# Patient Record
Sex: FEMALE | Race: WHITE | Employment: OTHER | URBAN - METROPOLITAN AREA
[De-identification: names, ages, dates, MRNs, and addresses within clinical notes are randomized per-mention and may not be internally consistent; named-entity substitution may affect disease eponyms.]

---

## 2022-07-21 ENCOUNTER — APPOINTMENT (OUTPATIENT)
Dept: CT IMAGING | Age: 67
DRG: 312 | End: 2022-07-21
Attending: EMERGENCY MEDICINE
Payer: MEDICARE

## 2022-07-21 ENCOUNTER — HOSPITAL ENCOUNTER (INPATIENT)
Age: 67
LOS: 2 days | Discharge: HOME | DRG: 312 | End: 2022-07-23
Attending: EMERGENCY MEDICINE | Admitting: FAMILY MEDICINE
Payer: MEDICARE

## 2022-07-21 ENCOUNTER — APPOINTMENT (OUTPATIENT)
Dept: GENERAL RADIOLOGY | Age: 67
DRG: 312 | End: 2022-07-21
Attending: EMERGENCY MEDICINE
Payer: MEDICARE

## 2022-07-21 ENCOUNTER — APPOINTMENT (OUTPATIENT)
Dept: NON INVASIVE DIAGNOSTICS | Age: 67
DRG: 312 | End: 2022-07-21
Attending: FAMILY MEDICINE
Payer: MEDICARE

## 2022-07-21 DIAGNOSIS — D72.819 LEUKOPENIA, UNSPECIFIED TYPE: ICD-10-CM

## 2022-07-21 DIAGNOSIS — R55 SYNCOPE AND COLLAPSE: Primary | ICD-10-CM

## 2022-07-21 PROBLEM — R50.9 FEVER: Status: ACTIVE | Noted: 2022-07-21

## 2022-07-21 PROBLEM — D61.818 PANCYTOPENIA (HCC): Status: ACTIVE | Noted: 2022-07-21

## 2022-07-21 LAB
ALBUMIN SERPL-MCNC: 3.6 G/DL (ref 3.4–5)
ALBUMIN/GLOB SERPL: 1 {RATIO} (ref 0.8–1.7)
ALP SERPL-CCNC: 93 U/L (ref 45–117)
ALT SERPL-CCNC: 102 U/L (ref 13–56)
ANION GAP SERPL CALC-SCNC: 8 MMOL/L (ref 3–18)
APPEARANCE UR: CLEAR
AST SERPL-CCNC: 66 U/L (ref 10–38)
BACTERIA URNS QL MICRO: ABNORMAL /HPF
BASOPHILS # BLD: 0 K/UL (ref 0–0.1)
BASOPHILS NFR BLD: 1 % (ref 0–2)
BILIRUB SERPL-MCNC: 1 MG/DL (ref 0.2–1)
BILIRUB UR QL: NEGATIVE
BUN SERPL-MCNC: 11 MG/DL (ref 7–18)
BUN/CREAT SERPL: 15 (ref 12–20)
CALCIUM SERPL-MCNC: 8.6 MG/DL (ref 8.5–10.1)
CHLORIDE SERPL-SCNC: 99 MMOL/L (ref 100–111)
CO2 SERPL-SCNC: 24 MMOL/L (ref 21–32)
COLOR UR: YELLOW
COVID-19 RAPID TEST, COVR: NOT DETECTED
CREAT SERPL-MCNC: 0.73 MG/DL (ref 0.6–1.3)
DIFFERENTIAL METHOD BLD: ABNORMAL
ECHO AO ASC DIAM: 2.6 CM
ECHO AO ASCENDING AORTA INDEX: 1.52 CM/M2
ECHO AO ROOT DIAM: 3 CM
ECHO AO ROOT INDEX: 1.75 CM/M2
ECHO AV AREA PEAK VELOCITY: 2.7 CM2
ECHO AV AREA VTI: 2.9 CM2
ECHO AV AREA/BSA PEAK VELOCITY: 1.6 CM2/M2
ECHO AV AREA/BSA VTI: 1.7 CM2/M2
ECHO AV MEAN GRADIENT: 4 MMHG
ECHO AV MEAN VELOCITY: 0.9 M/S
ECHO AV PEAK GRADIENT: 6 MMHG
ECHO AV PEAK VELOCITY: 1.2 M/S
ECHO AV VELOCITY RATIO: 0.92
ECHO AV VTI: 23.3 CM
ECHO EST RA PRESSURE: 3 MMHG
ECHO LA DIAMETER INDEX: 1.7 CM/M2
ECHO LA DIAMETER: 2.9 CM
ECHO LA TO AORTIC ROOT RATIO: 0.97
ECHO LA VOL 2C: 35 ML (ref 22–52)
ECHO LA VOL 4C: 23 ML (ref 22–52)
ECHO LA VOL BP: 30 ML (ref 22–52)
ECHO LA VOL/BSA BIPLANE: 18 ML/M2 (ref 16–34)
ECHO LA VOLUME AREA LENGTH: 32 ML
ECHO LA VOLUME INDEX A2C: 20 ML/M2 (ref 16–34)
ECHO LA VOLUME INDEX A4C: 13 ML/M2 (ref 16–34)
ECHO LA VOLUME INDEX AREA LENGTH: 19 ML/M2 (ref 16–34)
ECHO LV E' LATERAL VELOCITY: 13 CM/S
ECHO LV E' SEPTAL VELOCITY: 11 CM/S
ECHO LV EDV A2C: 42 ML
ECHO LV EDV A4C: 54 ML
ECHO LV EDV BP: 48 ML (ref 56–104)
ECHO LV EDV INDEX A4C: 32 ML/M2
ECHO LV EDV INDEX BP: 28 ML/M2
ECHO LV EDV NDEX A2C: 25 ML/M2
ECHO LV EJECTION FRACTION A2C: 65 %
ECHO LV EJECTION FRACTION A4C: 71 %
ECHO LV EJECTION FRACTION BIPLANE: 68 % (ref 55–100)
ECHO LV ESV A2C: 14 ML
ECHO LV ESV A4C: 16 ML
ECHO LV ESV BP: 15 ML (ref 19–49)
ECHO LV ESV INDEX A2C: 8 ML/M2
ECHO LV ESV INDEX A4C: 9 ML/M2
ECHO LV ESV INDEX BP: 9 ML/M2
ECHO LV FRACTIONAL SHORTENING: 49 % (ref 28–44)
ECHO LV INTERNAL DIMENSION DIASTOLE INDEX: 2.51 CM/M2
ECHO LV INTERNAL DIMENSION DIASTOLIC: 4.3 CM (ref 3.9–5.3)
ECHO LV INTERNAL DIMENSION SYSTOLIC INDEX: 1.29 CM/M2
ECHO LV INTERNAL DIMENSION SYSTOLIC: 2.2 CM
ECHO LV IVSD: 0.7 CM (ref 0.6–0.9)
ECHO LV MASS 2D: 96.8 G (ref 67–162)
ECHO LV MASS INDEX 2D: 56.6 G/M2 (ref 43–95)
ECHO LV POSTERIOR WALL DIASTOLIC: 0.8 CM (ref 0.6–0.9)
ECHO LV RELATIVE WALL THICKNESS RATIO: 0.37
ECHO LVOT AREA: 3.1 CM2
ECHO LVOT AV VTI INDEX: 0.94
ECHO LVOT DIAM: 2 CM
ECHO LVOT MEAN GRADIENT: 3 MMHG
ECHO LVOT PEAK GRADIENT: 4 MMHG
ECHO LVOT PEAK VELOCITY: 1.1 M/S
ECHO LVOT STROKE VOLUME INDEX: 40.2 ML/M2
ECHO LVOT SV: 68.8 ML
ECHO LVOT VTI: 21.9 CM
ECHO MV A VELOCITY: 0.75 M/S
ECHO MV E DECELERATION TIME (DT): 201.9 MS
ECHO MV E VELOCITY: 0.72 M/S
ECHO MV E/A RATIO: 0.96
ECHO MV E/E' LATERAL: 5.54
ECHO MV E/E' RATIO (AVERAGED): 6.04
ECHO MV E/E' SEPTAL: 6.55
ECHO RIGHT VENTRICULAR SYSTOLIC PRESSURE (RVSP): 30 MMHG
ECHO RV INTERNAL DIMENSION: 3.1 CM
ECHO RV TAPSE: 1.5 CM (ref 1.7–?)
ECHO RVOT MEAN GRADIENT: 2 MMHG
ECHO RVOT PEAK GRADIENT: 4 MMHG
ECHO RVOT PEAK VELOCITY: 0.9 M/S
ECHO RVOT VTI: 19.5 CM
ECHO TV REGURGITANT MAX VELOCITY: 2.61 M/S
ECHO TV REGURGITANT PEAK GRADIENT: 27 MMHG
EOSINOPHIL # BLD: 0 K/UL (ref 0–0.4)
EOSINOPHIL NFR BLD: 0 % (ref 0–5)
EPITH CASTS URNS QL MICRO: ABNORMAL /LPF (ref 0–5)
ERYTHROCYTE [DISTWIDTH] IN BLOOD BY AUTOMATED COUNT: 16.6 % (ref 11.6–14.5)
FLUAV AG NPH QL IA: NEGATIVE
FLUBV AG NOSE QL IA: NEGATIVE
GLOBULIN SER CALC-MCNC: 3.5 G/DL (ref 2–4)
GLUCOSE SERPL-MCNC: 171 MG/DL (ref 74–99)
GLUCOSE UR STRIP.AUTO-MCNC: 100 MG/DL
HCT VFR BLD AUTO: 28.4 % (ref 35–45)
HGB BLD-MCNC: 9.7 G/DL (ref 12–16)
HGB UR QL STRIP: ABNORMAL
IMM GRANULOCYTES # BLD AUTO: 0 K/UL (ref 0–0.04)
IMM GRANULOCYTES NFR BLD AUTO: 1 % (ref 0–0.5)
KETONES UR QL STRIP.AUTO: ABNORMAL MG/DL
LEUKOCYTE ESTERASE UR QL STRIP.AUTO: NEGATIVE
LYMPHOCYTES # BLD: 0.4 K/UL (ref 0.9–3.6)
LYMPHOCYTES NFR BLD: 38 % (ref 21–52)
MAGNESIUM SERPL-MCNC: 2.3 MG/DL (ref 1.6–2.6)
MCH RBC QN AUTO: 31.2 PG (ref 24–34)
MCHC RBC AUTO-ENTMCNC: 34.2 G/DL (ref 31–37)
MCV RBC AUTO: 91.3 FL (ref 78–100)
MONOCYTES # BLD: 0.1 K/UL (ref 0.05–1.2)
MONOCYTES NFR BLD: 10 % (ref 3–10)
NEUTS SEG # BLD: 0.6 K/UL (ref 1.8–8)
NEUTS SEG NFR BLD: 51 % (ref 40–73)
NITRITE UR QL STRIP.AUTO: NEGATIVE
NRBC # BLD: 0 K/UL (ref 0–0.01)
NRBC BLD-RTO: 0 PER 100 WBC
PH UR STRIP: 6.5 [PH] (ref 5–8)
PLATELET # BLD AUTO: 63 K/UL (ref 135–420)
PMV BLD AUTO: 10.5 FL (ref 9.2–11.8)
POTASSIUM SERPL-SCNC: 4.1 MMOL/L (ref 3.5–5.5)
PROT SERPL-MCNC: 7.1 G/DL (ref 6.4–8.2)
PROT UR STRIP-MCNC: ABNORMAL MG/DL
RBC # BLD AUTO: 3.11 M/UL (ref 4.2–5.3)
RBC #/AREA URNS HPF: ABNORMAL /HPF (ref 0–5)
SODIUM SERPL-SCNC: 131 MMOL/L (ref 136–145)
SOURCE, COVRS: NORMAL
SP GR UR REFRACTOMETRY: 1.01 (ref 1–1.03)
TROPONIN-HIGH SENSITIVITY: <3 NG/L (ref 0–54)
TROPONIN-HIGH SENSITIVITY: <3 NG/L (ref 0–54)
TSH SERPL DL<=0.05 MIU/L-ACNC: 1.31 UIU/ML (ref 0.36–3.74)
UROBILINOGEN UR QL STRIP.AUTO: 1 EU/DL (ref 0.2–1)
WBC # BLD AUTO: 1.1 K/UL (ref 4.6–13.2)
WBC URNS QL MICRO: ABNORMAL /HPF (ref 0–5)

## 2022-07-21 PROCEDURE — 71045 X-RAY EXAM CHEST 1 VIEW: CPT

## 2022-07-21 PROCEDURE — 87804 INFLUENZA ASSAY W/OPTIC: CPT

## 2022-07-21 PROCEDURE — 93306 TTE W/DOPPLER COMPLETE: CPT

## 2022-07-21 PROCEDURE — 74011250636 HC RX REV CODE- 250/636: Performed by: EMERGENCY MEDICINE

## 2022-07-21 PROCEDURE — 96361 HYDRATE IV INFUSION ADD-ON: CPT

## 2022-07-21 PROCEDURE — 84484 ASSAY OF TROPONIN QUANT: CPT

## 2022-07-21 PROCEDURE — 84443 ASSAY THYROID STIM HORMONE: CPT

## 2022-07-21 PROCEDURE — 36415 COLL VENOUS BLD VENIPUNCTURE: CPT

## 2022-07-21 PROCEDURE — 96374 THER/PROPH/DIAG INJ IV PUSH: CPT

## 2022-07-21 PROCEDURE — 88184 FLOWCYTOMETRY/ TC 1 MARKER: CPT

## 2022-07-21 PROCEDURE — 65270000046 HC RM TELEMETRY

## 2022-07-21 PROCEDURE — 74011000250 HC RX REV CODE- 250: Performed by: FAMILY MEDICINE

## 2022-07-21 PROCEDURE — 70450 CT HEAD/BRAIN W/O DYE: CPT

## 2022-07-21 PROCEDURE — 93005 ELECTROCARDIOGRAM TRACING: CPT

## 2022-07-21 PROCEDURE — 94761 N-INVAS EAR/PLS OXIMETRY MLT: CPT

## 2022-07-21 PROCEDURE — 72110 X-RAY EXAM L-2 SPINE 4/>VWS: CPT

## 2022-07-21 PROCEDURE — 80053 COMPREHEN METABOLIC PANEL: CPT

## 2022-07-21 PROCEDURE — 74011250637 HC RX REV CODE- 250/637: Performed by: FAMILY MEDICINE

## 2022-07-21 PROCEDURE — 81001 URINALYSIS AUTO W/SCOPE: CPT

## 2022-07-21 PROCEDURE — 87635 SARS-COV-2 COVID-19 AMP PRB: CPT

## 2022-07-21 PROCEDURE — 88185 FLOWCYTOMETRY/TC ADD-ON: CPT

## 2022-07-21 PROCEDURE — 74011250637 HC RX REV CODE- 250/637: Performed by: INTERNAL MEDICINE

## 2022-07-21 PROCEDURE — 85025 COMPLETE CBC W/AUTO DIFF WBC: CPT

## 2022-07-21 PROCEDURE — 96375 TX/PRO/DX INJ NEW DRUG ADDON: CPT

## 2022-07-21 PROCEDURE — 99285 EMERGENCY DEPT VISIT HI MDM: CPT

## 2022-07-21 PROCEDURE — 83735 ASSAY OF MAGNESIUM: CPT

## 2022-07-21 RX ORDER — SODIUM CHLORIDE 0.9 % (FLUSH) 0.9 %
5-40 SYRINGE (ML) INJECTION EVERY 8 HOURS
Status: DISCONTINUED | OUTPATIENT
Start: 2022-07-21 | End: 2022-07-23 | Stop reason: HOSPADM

## 2022-07-21 RX ORDER — SIMVASTATIN 5 MG/1
5 TABLET, FILM COATED ORAL
COMMUNITY

## 2022-07-21 RX ORDER — ONDANSETRON 2 MG/ML
4 INJECTION INTRAMUSCULAR; INTRAVENOUS ONCE
Status: COMPLETED | OUTPATIENT
Start: 2022-07-21 | End: 2022-07-21

## 2022-07-21 RX ORDER — GUAIFENESIN 600 MG/1
600 TABLET, EXTENDED RELEASE ORAL EVERY 12 HOURS
Status: DISCONTINUED | OUTPATIENT
Start: 2022-07-21 | End: 2022-07-22

## 2022-07-21 RX ORDER — OXYCODONE HYDROCHLORIDE 5 MG/1
5 TABLET ORAL
Status: DISCONTINUED | OUTPATIENT
Start: 2022-07-21 | End: 2022-07-23 | Stop reason: HOSPADM

## 2022-07-21 RX ORDER — SODIUM CHLORIDE 0.9 % (FLUSH) 0.9 %
5-40 SYRINGE (ML) INJECTION AS NEEDED
Status: DISCONTINUED | OUTPATIENT
Start: 2022-07-21 | End: 2022-07-23 | Stop reason: HOSPADM

## 2022-07-21 RX ORDER — TOLTERODINE 4 MG/1
4 CAPSULE, EXTENDED RELEASE ORAL DAILY
COMMUNITY

## 2022-07-21 RX ORDER — NALOXONE HYDROCHLORIDE 0.4 MG/ML
0.4 INJECTION, SOLUTION INTRAMUSCULAR; INTRAVENOUS; SUBCUTANEOUS AS NEEDED
Status: DISCONTINUED | OUTPATIENT
Start: 2022-07-21 | End: 2022-07-23 | Stop reason: HOSPADM

## 2022-07-21 RX ORDER — LIDOCAINE 4 G/100G
2 PATCH TOPICAL EVERY 24 HOURS
Status: DISCONTINUED | OUTPATIENT
Start: 2022-07-21 | End: 2022-07-23 | Stop reason: HOSPADM

## 2022-07-21 RX ORDER — MORPHINE SULFATE 2 MG/ML
2 INJECTION, SOLUTION INTRAMUSCULAR; INTRAVENOUS ONCE
Status: COMPLETED | OUTPATIENT
Start: 2022-07-21 | End: 2022-07-21

## 2022-07-21 RX ORDER — CALCIUM CARBONATE/VITAMIN D3 600 MG-125
600 TABLET ORAL 2 TIMES DAILY
COMMUNITY
End: 2022-07-23

## 2022-07-21 RX ORDER — DOCUSATE SODIUM 100 MG/1
100 CAPSULE, LIQUID FILLED ORAL 2 TIMES DAILY
Status: DISCONTINUED | OUTPATIENT
Start: 2022-07-21 | End: 2022-07-23 | Stop reason: HOSPADM

## 2022-07-21 RX ORDER — BISMUTH SUBSALICYLATE 262 MG
1 TABLET,CHEWABLE ORAL DAILY
COMMUNITY

## 2022-07-21 RX ORDER — ACETAMINOPHEN 325 MG/1
650 TABLET ORAL
Status: DISCONTINUED | OUTPATIENT
Start: 2022-07-21 | End: 2022-07-23 | Stop reason: HOSPADM

## 2022-07-21 RX ADMIN — ONDANSETRON 4 MG: 2 INJECTION INTRAMUSCULAR; INTRAVENOUS at 07:06

## 2022-07-21 RX ADMIN — DOCUSATE SODIUM 100 MG: 100 CAPSULE ORAL at 20:24

## 2022-07-21 RX ADMIN — SODIUM CHLORIDE 1000 ML: 900 INJECTION, SOLUTION INTRAVENOUS at 03:01

## 2022-07-21 RX ADMIN — SODIUM CHLORIDE, PRESERVATIVE FREE 10 ML: 5 INJECTION INTRAVENOUS at 22:00

## 2022-07-21 RX ADMIN — DOCUSATE SODIUM 100 MG: 100 CAPSULE ORAL at 10:42

## 2022-07-21 RX ADMIN — SODIUM CHLORIDE, PRESERVATIVE FREE 10 ML: 5 INJECTION INTRAVENOUS at 18:50

## 2022-07-21 RX ADMIN — ACETAMINOPHEN 650 MG: 325 TABLET ORAL at 12:17

## 2022-07-21 RX ADMIN — MORPHINE SULFATE 2 MG: 2 INJECTION, SOLUTION INTRAMUSCULAR; INTRAVENOUS at 03:01

## 2022-07-21 RX ADMIN — OXYCODONE 5 MG: 5 TABLET ORAL at 18:49

## 2022-07-21 RX ADMIN — GUAIFENESIN 600 MG: 600 TABLET, EXTENDED RELEASE ORAL at 20:24

## 2022-07-21 NOTE — ED NOTES
Pt ambulated to the restroom. Pt states that back pain is 10/10. Pt reports feeling dizzy and nauseous when getting up from the toilet back to the stretcher.

## 2022-07-21 NOTE — ED NOTES
Pt states that when she attempted to go to the bathroom \"something popped in her lower right side of her back causing her to fall and black out. \" The second fall happened when she attempted to go to the bathroom again and she hit her head but does not remember. Pt states the pain is more in her back then head.

## 2022-07-21 NOTE — PROGRESS NOTES
Problem: Pressure Injury - Risk of  Goal: *Prevention of pressure injury  Description: Document Chad Scale and appropriate interventions in the flowsheet. Outcome: Progressing Towards Goal  Note: Pressure Injury Interventions: Activity Interventions: PT/OT evaluation    Mobility Interventions: PT/OT evaluation, HOB 30 degrees or less, Float heels    Nutrition Interventions: Offer support with meals,snacks and hydration                     Problem: Patient Education: Go to Patient Education Activity  Goal: Patient/Family Education  Outcome: Progressing Towards Goal     Problem: Falls - Risk of  Goal: *Absence of Falls  Description: Document Tamar Fall Risk and appropriate interventions in the flowsheet.   Outcome: Progressing Towards Goal  Note: Fall Risk Interventions:  Mobility Interventions: Patient to call before getting OOB         Medication Interventions: Patient to call before getting OOB, Teach patient to arise slowly    Elimination Interventions: Call light in reach, Toilet paper/wipes in reach    History of Falls Interventions: Room close to nurse's station         Problem: Patient Education: Go to Patient Education Activity  Goal: Patient/Family Education  Outcome: Progressing Towards Goal

## 2022-07-21 NOTE — ED NOTES
Pt states that she does not have to use the restroom right now, when asked if she could provide an urine specimen.

## 2022-07-21 NOTE — H&P
History & Physical    Patient: Fernando Rowley MRN: [id number]  CSN: [id number]    YOB: 1955  Age: 75 y.o. Sex: female      DOA: 7/21/2022  Primary Care Provider:  Ana Lux MD      Assessment/Plan   39-year-old female essentially healthy except for history of hyperlipidemia and overactive bladder. Admitted for syncope, pancytopenia and fever. Pancytopenia and fever -  After having viral symptoms  Outpatient COVID-19 test negative, COVID-19 rapid test in the emergency room also negative, rapid flu antigen a and B- negative  White blood count 1.1, hemoglobin 9.7, hematocrit 28.4 and platelets 63  Consulted hematology oncology, spoke with Dr. Trent Mendez  Ordered flow cytometry per Dr. Caron Figueroa recommendation  Antipyretics  X-ray unremarkable    Syncope-  Echocardiogram: Normal left ventricular systolic function with a visually estimated EF of 55 - 60%  Monitor on telemetry     DVT and GI prophylaxis     Patient Active Problem List   Diagnosis Code    Leukopenia D72.819    Syncope R55    Fever R50.9       Estimated length of stay : 3-4 days     CC: Syncope       HPI:     Fernando Rowley is a 75 y.o. female essentially healthy except for history of hyperlipidemia and overactive bladder, takes simvastatin, multivitamin, calcium supplement and a medication for her bladder. Told that she has a history of anemia but is currently not taking any medications. She has no idea what her baseline hemoglobin is. Presents to the emergency room with a history of 6-7 days of fever. Yesterday she saw her PCP, Dr. Zabrina Henriquez male who she stated thought that she probably had a viral infection; obtain a rapid COVID test which was negative. Her symptoms began with fever and cold symptoms including cough, headache, loss of appetite, runny nose and congestion.   She came to the emergency room today because she had 2 syncopal episodes yesterday when she felt in her back and the second 1 she fell and hit her head. She states that she does not remember anything she was normal 1 minute and woke up on the floor. Additional history she states that she follows Dr. Santino Blanco for hematuria but the last time he checked her urine was clear. Additional history she denies any history of bleeding gums when she brushes her teeth, she denies vaginal bleeding or blood in her stools or dark stools or tarry stools. SHe is vaccinated for COVID-19 and booster and her flu is up-to-date    No past medical history on file. No past surgical history on file. No family history on file. Social History     Socioeconomic History    Marital status:        Prior to Admission medications    Medication Sig Start Date End Date Taking? Authorizing Provider   calcium-cholecalciferol, d3, (CALCIUM 600 + D) 600-125 mg-unit tab Take 600 Tablets by mouth two (2) times a day. Yes Provider, Historical   multivitamin (ONE A DAY) tablet Take 1 Tablet by mouth in the morning. Yes Provider, Historical   simvastatin (ZOCOR) 5 mg tablet Take 5 mg by mouth nightly. Indications: excessive fat in the blood   Yes Provider, Historical   tolterodine ER (DETROL LA) 4 mg ER capsule Take 4 mg by mouth in the morning. Indications: urinary urgency, or the sudden urge to urinate   Yes Provider, Historical       No Known Allergies    Review of Systems  Gen: No fever, chills, malaise, weight loss/gain. Heent: No headache, rhinorrhea, epistaxis, ear pain, hearing loss, sinus pain, neck pain/stiffness, sore throat. Heart: No chest pain, palpitations, HOPKINS, pnd, or orthopnea. Resp: No cough, hemoptysis, wheezing and shortness of breath. GI: No nausea, vomiting, diarrhea, constipation, melena or hematochezia. : No urinary obstruction, dysuria or hematuria. Derm: No rash, new skin lesion or pruritis. Musc/skeletal: no bone or joint complains. Vasc: No edema, cyanosis or claudication.    Endo: No heat/cold intolerance, no polyuria,polydipsia or polyphagia. Neuro: No unilateral weakness, numbness, tingling. No seizures. Heme: No easy bruising or bleeding. Physical Exam:     Physical Exam:  Visit Vitals  /77   Pulse 82   Temp 98.9 °F (37.2 °C)   Resp 18   Ht 5' 7\" (1.702 m)   Wt 61.2 kg (135 lb)   SpO2 98%   BMI 21.14 kg/m²      O2 Device: None (Room air)    Temp (24hrs), Av.5 °F (37.5 °C), Min:98.9 °F (37.2 °C), Max:100.1 °F (37.8 °C)    No intake/output data recorded. No intake/output data recorded. General:  Awake, cooperative, no distress. Head:  Normocephalic, without obvious abnormality, atraumatic. Eyes:  Conjunctivae/corneas clear, sclera anicteric, PERRL, EOMs intact. Nose: Nares normal. No drainage or sinus tenderness. Throat: Lips, mucosa, and tongue normal.    Neck: Supple, symmetrical, trachea midline, no adenopathy. Lungs:   Clear to auscultation bilaterally. Heart:  Regular rate and rhythm, S1, S2 normal, no murmur, click, rub or gallop. Abdomen: Soft, non-tender. Bowel sounds normal. No masses,  No organomegaly. Extremities: Extremities normal, atraumatic, no cyanosis or edema. Capillary refill normal.   Pulses: 2+ and symmetric all extremities. Skin: Skin color pink, turgor normal. No rashes or lesions   Neurologic: CNII-XII intact. No focal motor or sensory deficit. Labs Reviewed:    Lab results reviewed. For significant abnormal values and values requiring intervention, see assessment and plan.   Recent Results (from the past 24 hour(s))   EKG, 12 LEAD, INITIAL    Collection Time: 22  2:59 AM   Result Value Ref Range    Ventricular Rate 82 BPM    Atrial Rate 82 BPM    P-R Interval 134 ms    QRS Duration 80 ms    Q-T Interval 354 ms    QTC Calculation (Bezet) 413 ms    Calculated P Axis 76 degrees    Calculated R Axis 73 degrees    Calculated T Axis 16 degrees    Diagnosis       Normal sinus rhythm  T wave abnormality, consider anterior ischemia  Abnormal ECG  No previous ECGs available     CBC WITH AUTOMATED DIFF    Collection Time: 07/21/22  3:09 AM   Result Value Ref Range    WBC 1.1 (L) 4.6 - 13.2 K/uL    RBC 3.11 (L) 4.20 - 5.30 M/uL    HGB 9.7 (L) 12.0 - 16.0 g/dL    HCT 28.4 (L) 35.0 - 45.0 %    MCV 91.3 78.0 - 100.0 FL    MCH 31.2 24.0 - 34.0 PG    MCHC 34.2 31.0 - 37.0 g/dL    RDW 16.6 (H) 11.6 - 14.5 %    PLATELET 63 (L) 975 - 420 K/uL    MPV 10.5 9.2 - 11.8 FL    NRBC 0.0 0  WBC    ABSOLUTE NRBC 0.00 0.00 - 0.01 K/uL    NEUTROPHILS 51 40 - 73 %    LYMPHOCYTES 38 21 - 52 %    MONOCYTES 10 3 - 10 %    EOSINOPHILS 0 0 - 5 %    BASOPHILS 1 0 - 2 %    IMMATURE GRANULOCYTES 1 (H) 0.0 - 0.5 %    ABS. NEUTROPHILS 0.6 (L) 1.8 - 8.0 K/UL    ABS. LYMPHOCYTES 0.4 (L) 0.9 - 3.6 K/UL    ABS. MONOCYTES 0.1 0.05 - 1.2 K/UL    ABS. EOSINOPHILS 0.0 0.0 - 0.4 K/UL    ABS. BASOPHILS 0.0 0.0 - 0.1 K/UL    ABS. IMM. GRANS. 0.0 0.00 - 0.04 K/UL    DF AUTOMATED     METABOLIC PANEL, COMPREHENSIVE    Collection Time: 07/21/22  3:09 AM   Result Value Ref Range    Sodium 131 (L) 136 - 145 mmol/L    Potassium 4.1 3.5 - 5.5 mmol/L    Chloride 99 (L) 100 - 111 mmol/L    CO2 24 21 - 32 mmol/L    Anion gap 8 3.0 - 18 mmol/L    Glucose 171 (H) 74 - 99 mg/dL    BUN 11 7.0 - 18 MG/DL    Creatinine 0.73 0.6 - 1.3 MG/DL    BUN/Creatinine ratio 15 12 - 20      GFR est AA >60 >60 ml/min/1.73m2    GFR est non-AA >60 >60 ml/min/1.73m2    Calcium 8.6 8.5 - 10.1 MG/DL    Bilirubin, total 1.0 0.2 - 1.0 MG/DL    ALT (SGPT) 102 (H) 13 - 56 U/L    AST (SGOT) 66 (H) 10 - 38 U/L    Alk.  phosphatase 93 45 - 117 U/L    Protein, total 7.1 6.4 - 8.2 g/dL    Albumin 3.6 3.4 - 5.0 g/dL    Globulin 3.5 2.0 - 4.0 g/dL    A-G Ratio 1.0 0.8 - 1.7     MAGNESIUM    Collection Time: 07/21/22  3:09 AM   Result Value Ref Range    Magnesium 2.3 1.6 - 2.6 mg/dL   TROPONIN-HIGH SENSITIVITY    Collection Time: 07/21/22  3:09 AM   Result Value Ref Range    Troponin-High Sensitivity <3 0 - 54 ng/L   TSH 3RD GENERATION    Collection Time: 07/21/22  3:09 AM   Result Value Ref Range    TSH 1.31 0.36 - 3.74 uIU/mL   COVID-19 RAPID TEST    Collection Time: 07/21/22  3:15 AM   Result Value Ref Range    Specimen source Nasopharyngeal      COVID-19 rapid test Not detected NOTD     INFLUENZA A & B AG (RAPID TEST)    Collection Time: 07/21/22  3:16 AM   Result Value Ref Range    Influenza A Antigen Negative NEG      Influenza B Antigen Negative NEG     URINALYSIS W/ RFLX MICROSCOPIC    Collection Time: 07/21/22  6:36 AM   Result Value Ref Range    Color YELLOW      Appearance CLEAR      Specific gravity 1.014 1.005 - 1.030      pH (UA) 6.5 5.0 - 8.0      Protein TRACE (A) NEG mg/dL    Glucose 100 (A) NEG mg/dL    Ketone TRACE (A) NEG mg/dL    Bilirubin Negative NEG      Blood TRACE (A) NEG      Urobilinogen 1.0 0.2 - 1.0 EU/dL    Nitrites Negative NEG      Leukocyte Esterase Negative NEG     URINE MICROSCOPIC ONLY    Collection Time: 07/21/22  6:36 AM   Result Value Ref Range    WBC 0 to 3 0 - 5 /hpf    RBC 0 to 3 0 - 5 /hpf    Epithelial cells FEW 0 - 5 /lpf    Bacteria 1+ (A) NEG /hpf   TROPONIN-HIGH SENSITIVITY    Collection Time: 07/21/22 10:30 AM   Result Value Ref Range    Troponin-High Sensitivity <3 0 - 54 ng/L   ECHO ADULT COMPLETE    Collection Time: 07/21/22 12:08 PM   Result Value Ref Range    Fractional Shortening 2D 49 28 - 44 %    LV ESV Index BP 9 mL/m2    LV EDV Index BP 28 mL/m2    LV ESV Index A4C 9 mL/m2    LV EDV Index A4C 32 mL/m2    LV ESV Index A2C 8 mL/m2    LV EDV Index A2C 25 mL/m2    LVIDd Index 2.51 cm/m2    LVIDs Index 1.29 cm/m2    LV RWT Ratio 0.37     LV Mass 2D 96.8 67 - 162 g    LV Mass 2D Index 56.6 43 - 95 g/m2    MV E/A 0.96     E/E' Ratio (Averaged) 6.04     E/E' Lateral 5.54     E/E' Septal 6.55     LA Volume Index BP 18 16 - 34 ml/m2    LA Volume Index A/L 19 16 - 34 mL/m2    LVOT Stroke Volume Index 40.2 mL/m2    LVOT Area 3.1 cm2    LA Volume Index 2C 20 16 - 34 mL/m2    LA Volume Index 4C 13 (A) 16 - 34 mL/m2    LA Size Index 1.70 cm/m2    LA/AO Root Ratio 0.97     Ao Root Index 1.75 cm/m2    Ascending Aorta Index 1.52 cm/m2    AV Velocity Ratio 0.92     LVOT:AV VTI Index 0.94     KODY/BSA VTI 1.7 cm2/m2    KODY/BSA Peak Velocity 1.6 cm2/m2    Est. RA Pressure 3 mmHg    RVSP 30 mmHg    IVSd 0.7 0.6 - 0.9 cm    LVIDd 4.3 3.9 - 5.3 cm    LVIDs 2.2 cm    LVOT Diameter 2.0 cm    LVPWd 0.8 0.6 - 0.9 cm    EF BP 68 55 - 100 %    LV Ejection Fraction A2C 65 %    LV Ejection Fraction A4C 71 %    LV EDV A2C 42 mL    LV EDV A4C 54 mL    LV EDV BP 48 (A) 56 - 104 mL    LV ESV A2C 14 mL    LV ESV A4C 16 mL    LV ESV BP 15 (A) 19 - 49 mL    LVOT Peak Gradient 4 mmHg    LVOT Mean Gradient 3 mmHg    LVOT SV 68.8 ml    LVOT Peak Velocity 1.1 m/s    LVOT VTI 21.9 cm    RVIDd 3.1 cm    RVOT Peak Gradient 4 mmHg    RVOT Mean Gradient 2 mmHg    RVOT Peak Velocity 0.9 m/s    RVOT VTI 19.5 cm    LA Diameter 2.9 cm    LA Volume A/L 32 mL    LA Volume 2C 35 22 - 52 mL    LA Volume 4C 23 22 - 52 mL    LA Volume BP 30 22 - 52 mL    AV Area by Peak Velocity 2.7 cm2    AV Area by VTI 2.9 cm2    AV Peak Gradient 6 mmHg    AV Mean Gradient 4 mmHg    AV Peak Velocity 1.2 m/s    AV Mean Velocity 0.9 m/s    AV VTI 23.3 cm    MV A Velocity 0.75 m/s    MV E Wave Deceleration Time 201.9 ms    MV E Velocity 0.72 m/s    LV E' Lateral Velocity 13 cm/s    LV E' Septal Velocity 11 cm/s    TAPSE 1.5 (A) 1.7 cm    TR Peak Gradient 27 mmHg    TR Max Velocity 2.61 m/s    Ascending Aorta 2.6 cm    Aortic Root 3.0 cm       Procedures/imaging: see electronic medical records for all procedures/Xrays and details which were not copied into this note but were reviewed prior to creation of Plan      CC: Lucio Callahan MD

## 2022-07-21 NOTE — ED PROVIDER NOTES
EMERGENCY DEPARTMENT HISTORY AND PHYSICAL EXAM    Date: 7/21/2022  Patient Name: Shelia Young    History of Presenting Illness     No chief complaint on file. History Provided By: Patient and Patient's      History Elma Ibrahim):   2:46 AM  Shelia Young is a 75 y.o. female with no contributory PMHX who presents to the emergency department (room 14) C/O syncopal fall onset this evening prior to arrival. Associated sxs include low back pain. Pt denies fever, chills, nausea, vomiting, or any other sxs or complaints. Per patient and , she has had upper respiratory symptoms, cough and myalgias since Monday (4 days ago). She saw Dr. Karen Castro yesterday. She had a negative COVID test at that time and was diagnosed with a viral illness/sinusitis and did not have any medications prescribed for her at that time. Patient had an incident tonight where she fell and hurt her lower lumbar back.  states that later she was getting into bed and fell, hitting her head on the nightstand. Patient denies any blood thinners. She does endorse loss of consciousness with both episodes. Chief Complaint: Syncopal fall  Onset: Tonight  Timing:  Acute  Context: Symptoms started spontaneously, symptoms have rapidly worsened since onset  Location: Generalized  Quality:  With LOC  Severity: Moderate  Modifying Factors: Nothing makes it better, or worse. Associated Symptoms: Head contusion and low back pain    PCP: Juaquin García MD     Past History         Past Medical History:  No past medical history on file. Past Surgical History:  No past surgical history on file. Family History:  No family history on file. Reviewed and non-contributory    Social History:       Medications: Allergies:  No Known Allergies    Review of Systems      Review of Systems   Constitutional:  Negative for chills and fever. HENT:  Negative for congestion, rhinorrhea and sore throat.     Eyes:  Negative for pain and visual disturbance. Respiratory:  Negative for cough, shortness of breath and wheezing. Cardiovascular:  Negative for chest pain and palpitations. Gastrointestinal:  Negative for abdominal pain, diarrhea and vomiting. Genitourinary:  Negative for dysuria, flank pain, frequency and urgency. Musculoskeletal:  Positive for back pain. Negative for arthralgias and myalgias. Skin:  Negative for rash and wound. Neurological:  Positive for syncope. Negative for speech difficulty, weakness, light-headedness and headaches. Psychiatric/Behavioral:  Negative for agitation and confusion. All other systems reviewed and are negative. Physical Exam     Vitals:    07/21/22 0710 07/21/22 0712 07/21/22 0714 07/21/22 0934   BP: (!) 123/56 (!) 117/54 (!) 90/35 (!) 137/59   Pulse: 75 72 70 81   Resp:    20   Temp:    99.9 °F (37.7 °C)   SpO2: 100% 100% 100% 99%   Weight:       Height:           Physical Exam  Vitals and nursing note reviewed. Constitutional:       General: She is not in acute distress. Appearance: Normal appearance. She is normal weight. She is not ill-appearing. HENT:      Head: Normocephalic and atraumatic. Nose: Nose normal. No rhinorrhea. Mouth/Throat:      Mouth: Mucous membranes are moist.      Pharynx: No oropharyngeal exudate or posterior oropharyngeal erythema. Eyes:      Extraocular Movements: Extraocular movements intact. Conjunctiva/sclera: Conjunctivae normal.      Pupils: Pupils are equal, round, and reactive to light. Cardiovascular:      Rate and Rhythm: Normal rate and regular rhythm. Heart sounds: No murmur heard. No friction rub. No gallop. Pulmonary:      Effort: Pulmonary effort is normal. No respiratory distress. Breath sounds: Normal breath sounds. No wheezing, rhonchi or rales. Abdominal:      General: Bowel sounds are normal.      Palpations: Abdomen is soft. Tenderness: There is no abdominal tenderness.  There is no guarding or rebound. Musculoskeletal:         General: No swelling or deformity. Normal range of motion. Cervical back: Normal range of motion and neck supple. No rigidity. Lumbar back: Tenderness present. No swelling, edema, deformity, signs of trauma, lacerations, spasms or bony tenderness. Normal range of motion. No scoliosis. Back:    Lymphadenopathy:      Cervical: No cervical adenopathy. Skin:     General: Skin is warm and dry. Findings: No rash. Neurological:      General: No focal deficit present. Mental Status: She is alert and oriented to person, place, and time. Cranial Nerves: Cranial nerves are intact. No cranial nerve deficit, dysarthria or facial asymmetry. Sensory: No sensory deficit. Motor: Motor function is intact. No weakness, tremor, atrophy, abnormal muscle tone, seizure activity or pronator drift. Coordination: Coordination is intact. Romberg sign negative.  Coordination normal. Finger-Nose-Finger Test normal. Rapid alternating movements normal.      Comments: No truncal ataxia, normal test of skew   Psychiatric:         Mood and Affect: Mood normal.         Behavior: Behavior normal.       Diagnostic Study Results     Labs -  Recent Results (from the past 12 hour(s))   EKG, 12 LEAD, INITIAL    Collection Time: 07/21/22  2:59 AM   Result Value Ref Range    Ventricular Rate 82 BPM    Atrial Rate 82 BPM    P-R Interval 134 ms    QRS Duration 80 ms    Q-T Interval 354 ms    QTC Calculation (Bezet) 413 ms    Calculated P Axis 76 degrees    Calculated R Axis 73 degrees    Calculated T Axis 16 degrees    Diagnosis       Normal sinus rhythm  T wave abnormality, consider anterior ischemia  Abnormal ECG  No previous ECGs available     CBC WITH AUTOMATED DIFF    Collection Time: 07/21/22  3:09 AM   Result Value Ref Range    WBC 1.1 (L) 4.6 - 13.2 K/uL    RBC 3.11 (L) 4.20 - 5.30 M/uL    HGB 9.7 (L) 12.0 - 16.0 g/dL    HCT 28.4 (L) 35.0 - 45.0 % MCV 91.3 78.0 - 100.0 FL    MCH 31.2 24.0 - 34.0 PG    MCHC 34.2 31.0 - 37.0 g/dL    RDW 16.6 (H) 11.6 - 14.5 %    PLATELET 63 (L) 256 - 420 K/uL    MPV 10.5 9.2 - 11.8 FL    NRBC 0.0 0  WBC    ABSOLUTE NRBC 0.00 0.00 - 0.01 K/uL    NEUTROPHILS 51 40 - 73 %    LYMPHOCYTES 38 21 - 52 %    MONOCYTES 10 3 - 10 %    EOSINOPHILS 0 0 - 5 %    BASOPHILS 1 0 - 2 %    IMMATURE GRANULOCYTES 1 (H) 0.0 - 0.5 %    ABS. NEUTROPHILS 0.6 (L) 1.8 - 8.0 K/UL    ABS. LYMPHOCYTES 0.4 (L) 0.9 - 3.6 K/UL    ABS. MONOCYTES 0.1 0.05 - 1.2 K/UL    ABS. EOSINOPHILS 0.0 0.0 - 0.4 K/UL    ABS. BASOPHILS 0.0 0.0 - 0.1 K/UL    ABS. IMM. GRANS. 0.0 0.00 - 0.04 K/UL    DF AUTOMATED     METABOLIC PANEL, COMPREHENSIVE    Collection Time: 07/21/22  3:09 AM   Result Value Ref Range    Sodium 131 (L) 136 - 145 mmol/L    Potassium 4.1 3.5 - 5.5 mmol/L    Chloride 99 (L) 100 - 111 mmol/L    CO2 24 21 - 32 mmol/L    Anion gap 8 3.0 - 18 mmol/L    Glucose 171 (H) 74 - 99 mg/dL    BUN 11 7.0 - 18 MG/DL    Creatinine 0.73 0.6 - 1.3 MG/DL    BUN/Creatinine ratio 15 12 - 20      GFR est AA >60 >60 ml/min/1.73m2    GFR est non-AA >60 >60 ml/min/1.73m2    Calcium 8.6 8.5 - 10.1 MG/DL    Bilirubin, total 1.0 0.2 - 1.0 MG/DL    ALT (SGPT) 102 (H) 13 - 56 U/L    AST (SGOT) 66 (H) 10 - 38 U/L    Alk.  phosphatase 93 45 - 117 U/L    Protein, total 7.1 6.4 - 8.2 g/dL    Albumin 3.6 3.4 - 5.0 g/dL    Globulin 3.5 2.0 - 4.0 g/dL    A-G Ratio 1.0 0.8 - 1.7     MAGNESIUM    Collection Time: 07/21/22  3:09 AM   Result Value Ref Range    Magnesium 2.3 1.6 - 2.6 mg/dL   TROPONIN-HIGH SENSITIVITY    Collection Time: 07/21/22  3:09 AM   Result Value Ref Range    Troponin-High Sensitivity <3 0 - 54 ng/L   TSH 3RD GENERATION    Collection Time: 07/21/22  3:09 AM   Result Value Ref Range    TSH 1.31 0.36 - 3.74 uIU/mL   COVID-19 RAPID TEST    Collection Time: 07/21/22  3:15 AM   Result Value Ref Range    Specimen source Nasopharyngeal      COVID-19 rapid test Not detected NOTD     INFLUENZA A & B AG (RAPID TEST)    Collection Time: 07/21/22  3:16 AM   Result Value Ref Range    Influenza A Antigen Negative NEG      Influenza B Antigen Negative NEG     URINALYSIS W/ RFLX MICROSCOPIC    Collection Time: 07/21/22  6:36 AM   Result Value Ref Range    Color YELLOW      Appearance CLEAR      Specific gravity 1.014 1.005 - 1.030      pH (UA) 6.5 5.0 - 8.0      Protein TRACE (A) NEG mg/dL    Glucose 100 (A) NEG mg/dL    Ketone TRACE (A) NEG mg/dL    Bilirubin Negative NEG      Blood TRACE (A) NEG      Urobilinogen 1.0 0.2 - 1.0 EU/dL    Nitrites Negative NEG      Leukocyte Esterase Negative NEG     URINE MICROSCOPIC ONLY    Collection Time: 07/21/22  6:36 AM   Result Value Ref Range    WBC 0 to 3 0 - 5 /hpf    RBC 0 to 3 0 - 5 /hpf    Epithelial cells FEW 0 - 5 /lpf    Bacteria 1+ (A) NEG /hpf       Radiologic Studies -   CT HEAD WO CONT   Final Result      No acute intracranial abnormalities. Right sphenoid sinus opacity of uncertain acuity and current significance. XR CHEST PORT   Final Result   No acute cardiopulmonary disease. XR SPINE LUMB MIN 4 V   Final Result   Superior endplate depression of the L3 vertebral body of   indeterminate age. Although appears chronic, correlate with level pain. CT Results  (Last 48 hours)                 07/21/22 0413  CT HEAD WO CONT Final result    Impression:      No acute intracranial abnormalities. Right sphenoid sinus opacity of uncertain acuity and current significance. Narrative:  EXAM: CT head       INDICATION: Fall. Head injury. Pain. COMPARISON: None. TECHNIQUE: Axial CT imaging of the head was performed without intravenous   contrast. Dose reduction techniques used: automated exposure control, adjustment   of the mAs and/or kVp according to patient size, and iterative reconstruction   techniques.  Digital imaging and communications in Medicine (DICOM) format image   data are available to nonaffiliated external healthcare facilities or entities   on a secure, media free, reciprocally searchable basis with patient   authorization for at least 12 months after this study. _______________       FINDINGS:       BRAIN AND POSTERIOR FOSSA: The sulci, folia, ventricles and basal cisterns are   within normal limits for the patient's age. There is no intracranial hemorrhage,   mass effect, or midline shift. There are no areas of abnormal parenchymal   attenuation. EXTRA-AXIAL SPACES AND MENINGES: There are no abnormal extra-axial fluid   collections. CALVARIUM: Intact. SINUSES: There is a right sphenoid sinus opacity. There is ethmoid sinus mucosal   thickening. OTHER: None.       _______________                 CXR Results  (Last 48 hours)                 07/21/22 0343  XR CHEST PORT Final result    Impression:  No acute cardiopulmonary disease. Narrative:  EXAM:  PORTABLE CHEST       INDICATION:  Back pain. TECHNIQUE:  Portable, AP view. COMPARISON:  None.       ____________________       FINDINGS:         SUPPORT DEVICES: None. HEART AND MEDIASTINUM: Cardiomediastinal silhouette appears within normal   limits. Normal caliber thoracic aorta. LUNGS AND PLEURAL SPACES: Lungs are adequately expanded with no confluent   airspace opacity or pulmonary edema. No pleural effusion or pneumothorax. BONY THORAX AND SOFT TISSUES: No acute osseous abnormality.        ____________________                   Medications given in the ED-  Medications   sodium chloride (NS) flush 5-40 mL (has no administration in time range)   sodium chloride (NS) flush 5-40 mL (has no administration in time range)   acetaminophen (TYLENOL) tablet 650 mg (has no administration in time range)   oxyCODONE IR (ROXICODONE) tablet 5 mg (has no administration in time range)   naloxone (NARCAN) injection 0.4 mg (has no administration in time range)   docusate sodium (COLACE) capsule 100 mg (100 mg Oral Given 7/21/22 1042)   lidocaine 4 % patch 2 Patch (2 Patches TransDERmal Apply Patch 7/21/22 1042)   sodium chloride 0.9 % bolus infusion 1,000 mL (0 mL IntraVENous Transfusion Completed 7/21/22 0500)   morphine injection 2 mg (2 mg IntraVENous Given 7/21/22 0301)   ondansetron (ZOFRAN) injection 4 mg (4 mg IntraVENous Given 7/21/22 5138)       Procedures     Procedures    ED Course     I Prince Anya MD) am the first provider for this patient. I reviewed the vital signs, available nursing notes, past medical history, past surgical history, family history and social history. Records Reviewed: Nursing Notes    Cardiac Monitor:  Rate: 82 bpm  Rhythm: sinus rhythm    Pulse Oximetry Analysis - 99% on RA    EKG interpretation: (Preliminary)  Rhythm: NSR. Rate: 82 bpm; no STEMI  EKG read by Prince Anya MD at 2:59 AM    2:46 AM Initial assessment performed. The patients presenting problems have been discussed, and they are in agreement with the care plan formulated and outlined with them. I have encouraged them to ask questions as they arise throughout their visit. ED Course as of 07/21/22 1050   Thu Jul 21, 2022   3278 Discussed with Dr. Artis Reyna for telemetry admission, she will consult oncology. [JM]      ED Course User Index  [JM] Lillie Ochoa MD       Medical Decision Making     Provider Notes (Medical Decision Making):   DDX: Syncope, unlikely ACS, sequelae of viral illness, COVID, ICH, mass-effect, sprain, strain, fracture, subluxation    Discussion:  75 y.o. female with repeat syncopal events at home tonight and rapid sequence. Patient is unsteady in the ED when trying to go back and forth to the bathroom. She also has a marked leukopenia which may be related to her fever. She likely needs an oncology consult discussed this with the hospitalist on-call. Do not feel it is suitable to let this patient be discharged home.   Family is also not comfortable taking her home. We will admit her to the hospitalist service on telemetry molina for further work-up. Diagnosis and Disposition     Critical Care Time: 0 minutes    Core Measures:  For Hospitalized Patients:    1. Hospitalization Decision Time:  The decision to hospitalize the patient was made by Gerri Bell MD, MD at 6:45 AM on 7/21/2022    2. Aspirin: Aspirin was not given because the patient did not present with a stroke at the time of their Emergency Department evaluation    7:43 AM patient is being admitted to the hospital by Jerzy Barcenas. The results of their tests and reasons for their admission have been discussed with them and/or available family. They convey agreement and understanding for the need to be admitted and for their admission diagnosis. CONDITIONS ON ADMISSION:  Sepsis is not present at the time of admission. Deep Vein Thrombosis is not present at the time of admission. Thrombosis is not present at the time of admission. Urinary Tract Infection is not present at the time of admission. Pneumonia is not present at the time of admission. MRSA is not present at the time of admission. Wound infection is not present at the time of admission. Pressure Ulcer is not present at the time of admission. CLINICAL IMPRESSION:    1. Syncope and collapse    2. Leukopenia, unspecified type      I Kristie Nesbitt MD am the primary clinician of record. Dragon Disclaimer     Please note that this dictation was completed with [employer], the computer voice recognition software. Quite often unanticipated grammatical, syntax, homophones, and other interpretive errors are inadvertently transcribed by the computer software. Please disregard these errors. Please excuse any errors that have escaped final proofreading.     Kristie Nesbitt MD

## 2022-07-22 PROBLEM — M54.50 LOW BACK PAIN: Status: ACTIVE | Noted: 2022-07-22

## 2022-07-22 LAB
ALBUMIN SERPL-MCNC: 3.1 G/DL (ref 3.4–5)
ALBUMIN/GLOB SERPL: 0.8 {RATIO} (ref 0.8–1.7)
ALP SERPL-CCNC: 81 U/L (ref 45–117)
ALT SERPL-CCNC: 80 U/L (ref 13–56)
ANION GAP SERPL CALC-SCNC: 3 MMOL/L (ref 3–18)
AST SERPL-CCNC: 40 U/L (ref 10–38)
ATRIAL RATE: 82 BPM
BASOPHILS # BLD: 0 K/UL (ref 0–0.1)
BASOPHILS NFR BLD: 0 % (ref 0–2)
BILIRUB SERPL-MCNC: 0.8 MG/DL (ref 0.2–1)
BUN SERPL-MCNC: 11 MG/DL (ref 7–18)
BUN/CREAT SERPL: 16 (ref 12–20)
CALCIUM SERPL-MCNC: 9 MG/DL (ref 8.5–10.1)
CALCULATED P AXIS, ECG09: 76 DEGREES
CALCULATED R AXIS, ECG10: 73 DEGREES
CALCULATED T AXIS, ECG11: 16 DEGREES
CHLORIDE SERPL-SCNC: 102 MMOL/L (ref 100–111)
CO2 SERPL-SCNC: 29 MMOL/L (ref 21–32)
CREAT SERPL-MCNC: 0.7 MG/DL (ref 0.6–1.3)
DIAGNOSIS, 93000: NORMAL
DIFFERENTIAL METHOD BLD: ABNORMAL
EOSINOPHIL # BLD: 0 K/UL (ref 0–0.4)
EOSINOPHIL NFR BLD: 0 % (ref 0–5)
ERYTHROCYTE [DISTWIDTH] IN BLOOD BY AUTOMATED COUNT: 16.5 % (ref 11.6–14.5)
GLOBULIN SER CALC-MCNC: 4 G/DL (ref 2–4)
GLUCOSE SERPL-MCNC: 130 MG/DL (ref 74–99)
HCT VFR BLD AUTO: 26.3 % (ref 35–45)
HGB BLD-MCNC: 9 G/DL (ref 12–16)
IMM GRANULOCYTES # BLD AUTO: 0 K/UL
IMM GRANULOCYTES NFR BLD AUTO: 0 %
LYMPHOCYTES # BLD: 1.1 K/UL (ref 0.9–3.6)
LYMPHOCYTES NFR BLD: 84 % (ref 21–52)
MCH RBC QN AUTO: 31.7 PG (ref 24–34)
MCHC RBC AUTO-ENTMCNC: 34.2 G/DL (ref 31–37)
MCV RBC AUTO: 92.6 FL (ref 78–100)
MONOCYTES # BLD: 0 K/UL (ref 0.05–1.2)
MONOCYTES NFR BLD: 4 % (ref 3–10)
NEUTS SEG # BLD: 0.1 K/UL (ref 1.8–8)
NEUTS SEG NFR BLD: 12 % (ref 40–73)
NRBC # BLD: 0 K/UL (ref 0–0.01)
NRBC BLD-RTO: 0 PER 100 WBC
P-R INTERVAL, ECG05: 134 MS
PLATELET # BLD AUTO: 57 K/UL (ref 135–420)
PLATELET COMMENTS,PCOM: ABNORMAL
PMV BLD AUTO: 10.2 FL (ref 9.2–11.8)
POTASSIUM SERPL-SCNC: 4.4 MMOL/L (ref 3.5–5.5)
PROT SERPL-MCNC: 7.1 G/DL (ref 6.4–8.2)
Q-T INTERVAL, ECG07: 354 MS
QRS DURATION, ECG06: 80 MS
QTC CALCULATION (BEZET), ECG08: 413 MS
RBC # BLD AUTO: 2.84 M/UL (ref 4.2–5.3)
RBC MORPH BLD: ABNORMAL
SODIUM SERPL-SCNC: 134 MMOL/L (ref 136–145)
TOTAL CELLS COUNTED SPEC: 25
VENTRICULAR RATE, ECG03: 82 BPM
WBC # BLD AUTO: 1.2 K/UL (ref 4.6–13.2)
WBC MORPH BLD: ABNORMAL

## 2022-07-22 PROCEDURE — 80053 COMPREHEN METABOLIC PANEL: CPT

## 2022-07-22 PROCEDURE — 74011250637 HC RX REV CODE- 250/637: Performed by: FAMILY MEDICINE

## 2022-07-22 PROCEDURE — 36415 COLL VENOUS BLD VENIPUNCTURE: CPT

## 2022-07-22 PROCEDURE — 74011250637 HC RX REV CODE- 250/637: Performed by: INTERNAL MEDICINE

## 2022-07-22 PROCEDURE — 65270000046 HC RM TELEMETRY

## 2022-07-22 PROCEDURE — 85025 COMPLETE CBC W/AUTO DIFF WBC: CPT

## 2022-07-22 PROCEDURE — 74011000250 HC RX REV CODE- 250: Performed by: FAMILY MEDICINE

## 2022-07-22 RX ORDER — GUAIFENESIN 600 MG/1
600 TABLET, EXTENDED RELEASE ORAL
Status: DISCONTINUED | OUTPATIENT
Start: 2022-07-22 | End: 2022-07-23 | Stop reason: HOSPADM

## 2022-07-22 RX ORDER — DICLOFENAC SODIUM 10 MG/G
4 GEL TOPICAL 4 TIMES DAILY
Status: DISCONTINUED | OUTPATIENT
Start: 2022-07-22 | End: 2022-07-23 | Stop reason: HOSPADM

## 2022-07-22 RX ADMIN — DICLOFENAC SODIUM 4 G: 10 GEL TOPICAL at 22:00

## 2022-07-22 RX ADMIN — DOCUSATE SODIUM 100 MG: 100 CAPSULE ORAL at 08:57

## 2022-07-22 RX ADMIN — DICLOFENAC SODIUM 4 G: 10 GEL TOPICAL at 19:02

## 2022-07-22 RX ADMIN — GUAIFENESIN 600 MG: 600 TABLET, EXTENDED RELEASE ORAL at 02:35

## 2022-07-22 RX ADMIN — SODIUM CHLORIDE, PRESERVATIVE FREE 10 ML: 5 INJECTION INTRAVENOUS at 05:23

## 2022-07-22 RX ADMIN — SODIUM CHLORIDE, PRESERVATIVE FREE 10 ML: 5 INJECTION INTRAVENOUS at 21:11

## 2022-07-22 RX ADMIN — ACETAMINOPHEN 650 MG: 325 TABLET ORAL at 00:21

## 2022-07-22 RX ADMIN — DOCUSATE SODIUM 100 MG: 100 CAPSULE ORAL at 20:24

## 2022-07-22 NOTE — PROGRESS NOTES
PT order received and chart reviewed. Pt declined participation with mobility at this time due to pain. Pt reports the pain continues to increased and \"the doctor is ordering an MRI. \" Will follow up tomorrow as pt schedule allows.

## 2022-07-22 NOTE — PROGRESS NOTES
OT note: attempt for OT assessment. Pt deferred participation due to pain and \" doctor is ordering an MRI. \" Will follow up tomorrow as pt schedule permits.

## 2022-07-22 NOTE — PROGRESS NOTES
Hospitalist Progress Note    Patient: Uday Laws MRN: [id number]  CSN: [id number]    YOB: 1955  Age: 75 y.o. Sex: female    DOA: 7/21/2022 LOS:  LOS: 1 day          Chief Complaint:    syncope, pancytopenia and fever. Assessment/Plan   29-year-old female essentially healthy except for history of hyperlipidemia and overactive bladder. Admitted for syncope, pancytopenia and fever.       Pancytopenia and fever -  Labs not improved much   Hemo/oncology following   Likely viral     Severe low back pain-  Lidocaine patch not helping   Plain films not diagnostic  Will order MRI   PT/OT      Syncope-  Continue cardiac monitoring   Echocardiogram: Normal left ventricular systolic function with a visually estimated EF of 55 - 60%  Monitor on telemetry     DVT and GI prophylaxis     Disposition :  Patient Active Problem List   Diagnosis Code    Pancytopenia (HCC) D61.818    Syncope R55    Fever R50.9    Low back pain M54.50       Subjective:    Severe back pain, worse when she lean forward  Started after she fainted  No radicular sxs  No radiation of pain    Review of systems:    Constitutional: denies fevers, chills, myalgias  Respiratory: denies SOB, cough  Cardiovascular: denies chest pain, palpitations  Gastrointestinal: denies nausea, vomiting, diarrhea      Vital signs/Intake and Output:  Visit Vitals  BP (!) 120/57   Pulse 92   Temp 99.3 °F (37.4 °C)   Resp 18   Ht 5' 7\" (1.702 m)   Wt 61.2 kg (135 lb)   SpO2 98%   BMI 21.14 kg/m²     Current Shift:  07/22 0701 - 07/22 1900  In: 240 [P.O.:240]  Out: -   Last three shifts:  07/20 1901 - 07/22 0700  In: -   Out: 1 [Urine:1]    Exam:    General: Well developed, alert, NAD, OX3  Head/Neck: NCAT, supple, No masses, No lymphadenopathy  CVS:Regular rate and rhythm, no M/R/G, S1/S2 heard, no thrill  Lungs:Clear to auscultation bilaterally, no wheezes, rhonchi, or rales  Abdomen: Soft, Nontender, No distention, Normal Bowel sounds, No hepatomegaly  Extremities: No C/C/E, pulses palpable 2+  Skin:normal texture and turgor, no rashes, no lesions  Neuro:grossly normal , follows commands  Psych:appropriate                Labs: Results:       Chemistry Recent Labs     07/22/22 0411 07/21/22 0309   * 171*   * 131*   K 4.4 4.1    99*   CO2 29 24   BUN 11 11   CREA 0.70 0.73   CA 9.0 8.6   AGAP 3 8   BUCR 16 15   AP 81 93   TP 7.1 7.1   ALB 3.1* 3.6   GLOB 4.0 3.5   AGRAT 0.8 1.0      CBC w/Diff Recent Labs     07/22/22 0411 07/21/22  0309   WBC 1.2* 1.1*   RBC 2.84* 3.11*   HGB 9.0* 9.7*   HCT 26.3* 28.4*   PLT 57* 63*   GRANS 12* 51   LYMPH 84* 38   EOS 0 0      Cardiac Enzymes No results for input(s): CPK, CKND1, JOB in the last 72 hours. No lab exists for component: CKRMB, TROIP   Coagulation No results for input(s): PTP, INR, APTT, INREXT, INREXT in the last 72 hours. Lipid Panel No results found for: CHOL, CHOLPOCT, CHOLX, CHLST, CHOLV, 514254, HDL, HDLP, LDL, LDLC, DLDLP, 448278, VLDLC, VLDL, TGLX, TRIGL, TRIGP, TGLPOCT, CHHD, CHHDX   BNP No results for input(s): BNPP in the last 72 hours.    Liver Enzymes Recent Labs     07/22/22 0411   TP 7.1   ALB 3.1*   AP 81      Thyroid Studies Lab Results   Component Value Date/Time    TSH 1.31 07/21/2022 03:09 AM        Procedures/imaging: see electronic medical records for all procedures/Xrays and details which were not copied into this note but were reviewed prior to creation of Thu Paz MD

## 2022-07-22 NOTE — PROGRESS NOTES
CM spoke with patient and family who stated they feel she may need a walker at home \"at least for short time\" as she had falls at home. Therapy orders added.

## 2022-07-22 NOTE — CONSULTS
Phone: [phone]  Paging : [phone]      Hematology / Oncology Progress Note    Admit Date: 2022    Assessment:   66yo w/ fully vaccinated against COVID admitted w/ syncope and mild pancytopenia   Principal Problem:    Pancytopenia ([address] (2022)    Active Problems:    Syncope (2022)      Fever (2022)        Plan: Will follow peripherally  Suspect Viral Syndrome - causing the pancytopenia  No role for inpatient bone marrow biopsy - patient is non-toxic appearing  Will arrange for outpatient follow up with [facility]        Subjective:     66yo patient of Dr. Archana Mirza who presented with upper sinus congestion and fever. Patient w/ negative COVID-19 tests Patient w/ + fall s/p syncopal episode w/ new onset+pain. Counts noted. No bleeding, low grade temps.     Objective:     Patient Vitals for the past 24 hrs:   BP Temp Pulse Resp SpO2 Height Weight   22 0729 113/61 98.7 °F (37.1 °C) 83 18 96 % -- --   22 0420 (!) 117/57 98.7 °F (37.1 °C) 81 18 96 % -- --   22 132/77 98.9 °F (37.2 °C) 82 18 98 % -- --   22 1657 125/63 99 °F (37.2 °C) 75 18 97 % -- --   22 1204 (!) 133/55 100.1 °F (37.8 °C) 86 18 95 % -- --   22 1125 (!) 137/59 -- -- -- -- 5' 7\" (1.702 m) 61.2 kg (135 lb)   22 1017 -- -- -- -- 98 % -- --   22 1014 -- -- -- -- 98 % -- --   22 0934 (!) 137/59 99.9 °F (37.7 °C) 81 20 99 % -- --         Intake/Output Summary (Last 24 hours) at 2022 0757  Last data filed at 2022 0421  Gross per 24 hour   Intake --   Output 1 ml   Net -1 ml       Temp (24hrs), Av.2 °F (37.3 °C), Min:98.7 °F (37.1 °C), Max:100.1 °F (37.8 °C)             Exam:  Normal physical exam    Recent Results (from the past 24 hour(s))   TROPONIN-HIGH SENSITIVITY    Collection Time: 22 10:30 AM   Result Value Ref Range    Troponin-High Sensitivity <3 0 - 54 ng/L   ECHO ADULT COMPLETE    Collection Time: 22 12:08 PM   Result Value Ref Range Fractional Shortening 2D 49 28 - 44 %    LV ESV Index BP 9 mL/m2    LV EDV Index BP 28 mL/m2    LV ESV Index A4C 9 mL/m2    LV EDV Index A4C 32 mL/m2    LV ESV Index A2C 8 mL/m2    LV EDV Index A2C 25 mL/m2    LVIDd Index 2.51 cm/m2    LVIDs Index 1.29 cm/m2    LV RWT Ratio 0.37     LV Mass 2D 96.8 67 - 162 g    LV Mass 2D Index 56.6 43 - 95 g/m2    MV E/A 0.96     E/E' Ratio (Averaged) 6.04     E/E' Lateral 5.54     E/E' Septal 6.55     LA Volume Index BP 18 16 - 34 ml/m2    LA Volume Index A/L 19 16 - 34 mL/m2    LVOT Stroke Volume Index 40.2 mL/m2    LVOT Area 3.1 cm2    LA Volume Index 2C 20 16 - 34 mL/m2    LA Volume Index 4C 13 (A) 16 - 34 mL/m2    LA Size Index 1.70 cm/m2    LA/AO Root Ratio 0.97     Ao Root Index 1.75 cm/m2    Ascending Aorta Index 1.52 cm/m2    AV Velocity Ratio 0.92     LVOT:AV VTI Index 0.94     KODY/BSA VTI 1.7 cm2/m2    KODY/BSA Peak Velocity 1.6 cm2/m2    Est. RA Pressure 3 mmHg    RVSP 30 mmHg    IVSd 0.7 0.6 - 0.9 cm    LVIDd 4.3 3.9 - 5.3 cm    LVIDs 2.2 cm    LVOT Diameter 2.0 cm    LVPWd 0.8 0.6 - 0.9 cm    EF BP 68 55 - 100 %    LV Ejection Fraction A2C 65 %    LV Ejection Fraction A4C 71 %    LV EDV A2C 42 mL    LV EDV A4C 54 mL    LV EDV BP 48 (A) 56 - 104 mL    LV ESV A2C 14 mL    LV ESV A4C 16 mL    LV ESV BP 15 (A) 19 - 49 mL    LVOT Peak Gradient 4 mmHg    LVOT Mean Gradient 3 mmHg    LVOT SV 68.8 ml    LVOT Peak Velocity 1.1 m/s    LVOT VTI 21.9 cm    RVIDd 3.1 cm    RVOT Peak Gradient 4 mmHg    RVOT Mean Gradient 2 mmHg    RVOT Peak Velocity 0.9 m/s    RVOT VTI 19.5 cm    LA Diameter 2.9 cm    LA Volume A/L 32 mL    LA Volume 2C 35 22 - 52 mL    LA Volume 4C 23 22 - 52 mL    LA Volume BP 30 22 - 52 mL    AV Area by Peak Velocity 2.7 cm2    AV Area by VTI 2.9 cm2    AV Peak Gradient 6 mmHg    AV Mean Gradient 4 mmHg    AV Peak Velocity 1.2 m/s    AV Mean Velocity 0.9 m/s    AV VTI 23.3 cm    MV A Velocity 0.75 m/s    MV E Wave Deceleration Time 201.9 ms    MV E Velocity 0.72 m/s    LV E' Lateral Velocity 13 cm/s    LV E' Septal Velocity 11 cm/s    TAPSE 1.5 (A) 1.7 cm    TR Peak Gradient 27 mmHg    TR Max Velocity 2.61 m/s    Ascending Aorta 2.6 cm    Aortic Root 3.0 cm   METABOLIC PANEL, COMPREHENSIVE    Collection Time: 07/22/22  4:11 AM   Result Value Ref Range    Sodium 134 (L) 136 - 145 mmol/L    Potassium 4.4 3.5 - 5.5 mmol/L    Chloride 102 100 - 111 mmol/L    CO2 29 21 - 32 mmol/L    Anion gap 3 3.0 - 18 mmol/L    Glucose 130 (H) 74 - 99 mg/dL    BUN 11 7.0 - 18 MG/DL    Creatinine 0.70 0.6 - 1.3 MG/DL    BUN/Creatinine ratio 16 12 - 20      GFR est AA >60 >60 ml/min/1.73m2    GFR est non-AA >60 >60 ml/min/1.73m2    Calcium 9.0 8.5 - 10.1 MG/DL    Bilirubin, total 0.8 0.2 - 1.0 MG/DL    ALT (SGPT) 80 (H) 13 - 56 U/L    AST (SGOT) 40 (H) 10 - 38 U/L    Alk. phosphatase 81 45 - 117 U/L    Protein, total 7.1 6.4 - 8.2 g/dL    Albumin 3.1 (L) 3.4 - 5.0 g/dL    Globulin 4.0 2.0 - 4.0 g/dL    A-G Ratio 0.8 0.8 - 1.7     CBC WITH AUTOMATED DIFF    Collection Time: 07/22/22  4:11 AM   Result Value Ref Range    WBC 1.2 (L) 4.6 - 13.2 K/uL    RBC 2.84 (L) 4.20 - 5.30 M/uL    HGB 9.0 (L) 12.0 - 16.0 g/dL    HCT 26.3 (L) 35.0 - 45.0 %    MCV 92.6 78.0 - 100.0 FL    MCH 31.7 24.0 - 34.0 PG    MCHC 34.2 31.0 - 37.0 g/dL    RDW 16.5 (H) 11.6 - 14.5 %    PLATELET 57 (L) 068 - 420 K/uL    MPV 10.2 9.2 - 11.8 FL    NRBC 0.0 0  WBC    ABSOLUTE NRBC 0.00 0.00 - 0.01 K/uL    NEUTROPHILS 12 (L) 40 - 73 %    LYMPHOCYTES 84 (H) 21 - 52 %    MONOCYTES 4 3 - 10 %    EOSINOPHILS 0 0 - 5 %    BASOPHILS 0 0 - 2 %    IMMATURE GRANULOCYTES 0 %    TOTAL CELLS COUNTED 25      ABS. NEUTROPHILS 0.1 (L) 1.8 - 8.0 K/UL    ABS. LYMPHOCYTES 1.1 0.9 - 3.6 K/UL    ABS. MONOCYTES 0.0 (L) 0.05 - 1.2 K/UL    ABS. EOSINOPHILS 0.0 0.0 - 0.4 K/UL    ABS. BASOPHILS 0.0 0.0 - 0.1 K/UL    ABS. IMM.  GRANS. 0.0 K/UL    DF MANUAL      PLATELET COMMENTS DECREASED PLATELETS      RBC COMMENTS ACANTHOCYTES  1+        WBC Sonya Dodge MD

## 2022-07-23 ENCOUNTER — APPOINTMENT (OUTPATIENT)
Dept: MRI IMAGING | Age: 67
DRG: 312 | End: 2022-07-23
Attending: FAMILY MEDICINE
Payer: MEDICARE

## 2022-07-23 VITALS
SYSTOLIC BLOOD PRESSURE: 120 MMHG | DIASTOLIC BLOOD PRESSURE: 61 MMHG | HEART RATE: 91 BPM | WEIGHT: 135 LBS | OXYGEN SATURATION: 97 % | BODY MASS INDEX: 21.19 KG/M2 | TEMPERATURE: 98.2 F | HEIGHT: 67 IN | RESPIRATION RATE: 18 BRPM

## 2022-07-23 LAB
ALBUMIN SERPL-MCNC: 2.9 G/DL (ref 3.4–5)
ALBUMIN/GLOB SERPL: 0.7 {RATIO} (ref 0.8–1.7)
ALP SERPL-CCNC: 102 U/L (ref 45–117)
ALT SERPL-CCNC: 113 U/L (ref 13–56)
ANION GAP SERPL CALC-SCNC: 3 MMOL/L (ref 3–18)
AST SERPL-CCNC: 82 U/L (ref 10–38)
BASOPHILS # BLD: 0 K/UL (ref 0–0.1)
BASOPHILS NFR BLD: 0 % (ref 0–2)
BILIRUB SERPL-MCNC: 0.7 MG/DL (ref 0.2–1)
BUN SERPL-MCNC: 9 MG/DL (ref 7–18)
BUN/CREAT SERPL: 15 (ref 12–20)
CALCIUM SERPL-MCNC: 9 MG/DL (ref 8.5–10.1)
CHLORIDE SERPL-SCNC: 99 MMOL/L (ref 100–111)
CO2 SERPL-SCNC: 29 MMOL/L (ref 21–32)
CREAT SERPL-MCNC: 0.61 MG/DL (ref 0.6–1.3)
DIFFERENTIAL METHOD BLD: ABNORMAL
EOSINOPHIL # BLD: 0 K/UL (ref 0–0.4)
EOSINOPHIL NFR BLD: 0 % (ref 0–5)
ERYTHROCYTE [DISTWIDTH] IN BLOOD BY AUTOMATED COUNT: 16.3 % (ref 11.6–14.5)
GLOBULIN SER CALC-MCNC: 4.1 G/DL (ref 2–4)
GLUCOSE SERPL-MCNC: 128 MG/DL (ref 74–99)
HCT VFR BLD AUTO: 26.1 % (ref 35–45)
HGB BLD-MCNC: 8.7 G/DL (ref 12–16)
IMM GRANULOCYTES # BLD AUTO: 0 K/UL
IMM GRANULOCYTES NFR BLD AUTO: 0 %
LYMPHOCYTES # BLD: 0.6 K/UL (ref 0.9–3.6)
LYMPHOCYTES NFR BLD: 44 % (ref 21–52)
MCH RBC QN AUTO: 30.7 PG (ref 24–34)
MCHC RBC AUTO-ENTMCNC: 33.3 G/DL (ref 31–37)
MCV RBC AUTO: 92.2 FL (ref 78–100)
METAMYELOCYTES NFR BLD MANUAL: 4 %
MONOCYTES # BLD: 0.1 K/UL (ref 0.05–1.2)
MONOCYTES NFR BLD: 10 % (ref 3–10)
NEUTS BAND NFR BLD MANUAL: 6 % (ref 0–5)
NEUTS SEG # BLD: 0.6 K/UL (ref 1.8–8)
NEUTS SEG NFR BLD: 36 % (ref 40–73)
NRBC # BLD: 0 K/UL (ref 0–0.01)
NRBC BLD-RTO: 0 PER 100 WBC
PLATELET # BLD AUTO: 57 K/UL (ref 135–420)
PLATELET COMMENTS,PCOM: ABNORMAL
PMV BLD AUTO: 10.2 FL (ref 9.2–11.8)
POTASSIUM SERPL-SCNC: 4 MMOL/L (ref 3.5–5.5)
PROT SERPL-MCNC: 7 G/DL (ref 6.4–8.2)
RBC # BLD AUTO: 2.83 M/UL (ref 4.2–5.3)
RBC MORPH BLD: ABNORMAL
SODIUM SERPL-SCNC: 131 MMOL/L (ref 136–145)
TOTAL CELLS COUNTED SPEC: 50
WBC # BLD AUTO: 1.4 K/UL (ref 4.6–13.2)

## 2022-07-23 PROCEDURE — 97116 GAIT TRAINING THERAPY: CPT

## 2022-07-23 PROCEDURE — 80053 COMPREHEN METABOLIC PANEL: CPT

## 2022-07-23 PROCEDURE — 36415 COLL VENOUS BLD VENIPUNCTURE: CPT

## 2022-07-23 PROCEDURE — 72158 MRI LUMBAR SPINE W/O & W/DYE: CPT

## 2022-07-23 PROCEDURE — 74011000250 HC RX REV CODE- 250: Performed by: FAMILY MEDICINE

## 2022-07-23 PROCEDURE — 72197 MRI PELVIS W/O & W/DYE: CPT

## 2022-07-23 PROCEDURE — 97161 PT EVAL LOW COMPLEX 20 MIN: CPT

## 2022-07-23 PROCEDURE — 74011250637 HC RX REV CODE- 250/637: Performed by: FAMILY MEDICINE

## 2022-07-23 PROCEDURE — 74011250636 HC RX REV CODE- 250/636: Performed by: FAMILY MEDICINE

## 2022-07-23 PROCEDURE — 85025 COMPLETE CBC W/AUTO DIFF WBC: CPT

## 2022-07-23 PROCEDURE — 97535 SELF CARE MNGMENT TRAINING: CPT

## 2022-07-23 PROCEDURE — A9576 INJ PROHANCE MULTIPACK: HCPCS | Performed by: FAMILY MEDICINE

## 2022-07-23 PROCEDURE — 97165 OT EVAL LOW COMPLEX 30 MIN: CPT

## 2022-07-23 RX ADMIN — SODIUM CHLORIDE, PRESERVATIVE FREE 10 ML: 5 INJECTION INTRAVENOUS at 14:00

## 2022-07-23 RX ADMIN — SODIUM CHLORIDE, PRESERVATIVE FREE 10 ML: 5 INJECTION INTRAVENOUS at 05:35

## 2022-07-23 RX ADMIN — DOCUSATE SODIUM 100 MG: 100 CAPSULE ORAL at 08:25

## 2022-07-23 RX ADMIN — GADOTERIDOL 10 ML: 279.3 INJECTION, SOLUTION INTRAVENOUS at 12:29

## 2022-07-23 RX ADMIN — DICLOFENAC SODIUM 4 G: 10 GEL TOPICAL at 08:26

## 2022-07-23 RX ADMIN — DICLOFENAC SODIUM 4 G: 10 GEL TOPICAL at 13:00

## 2022-07-23 NOTE — PROGRESS NOTES
Alert and oriented x4, assessments and VS completed, medications administered, pt has reports of back pain and scheduled medication provided=, pt out of bed with walker, no complaints of chest pain or shortness of breath noted. Discharge instruction provided, pt verbalized understanding.

## 2022-07-23 NOTE — DISCHARGE SUMMARY
Discharge Summary    Patient: Gabriella Barker MRN: [id number]  CSN: [id number]    YOB: 1955  Age: 75 y.o. Sex: female    DOA: 7/21/2022 LOS:  LOS: 2 days   Discharge Date:      Primary Care Provider:  Reddy Zimmerman MD    Admission Diagnoses: Syncope [R55]  Leukopenia [D72.819]    Discharge Diagnoses:    Problem List as of 7/23/2022 Never Reviewed            Codes Class Noted - Resolved    Low back pain ICD-10-CM: M54.50  ICD-9-CM: 724.2  7/22/2022 - Present        * (Principal) Pancytopenia ([facility] [address] ICD-10-CM: N95.247  ICD-9-CM: 284.19  7/21/2022 - Present        Syncope ICD-10-CM: R55  ICD-9-CM: 780.2  7/21/2022 - Present        Fever ICD-10-CM: R50.9  ICD-9-CM: 780.60  7/21/2022 - Present           Discharge Medications:     Current Discharge Medication List        CONTINUE these medications which have NOT CHANGED    Details   multivitamin (ONE A DAY) tablet Take 1 Tablet by mouth in the morning. simvastatin (ZOCOR) 5 mg tablet Take 5 mg by mouth nightly. Indications: excessive fat in the blood      tolterodine ER (DETROL LA) 4 mg ER capsule Take 4 mg by mouth in the morning. Indications: urinary urgency, or the sudden urge to urinate           STOP taking these medications       calcium-cholecalciferol, d3, (CALCIUM 600 + D) 600-125 mg-unit tab Comments:   Reason for Stopping:               Discharge Condition: Stable      Consults: Hematology/Oncology      PHYSICAL EXAm  Visit Vitals  /61   Pulse 91   Temp 98.2 °F (36.8 °C)   Resp 18   Ht 5' 7\" (1.702 m)   Wt 61.2 kg (135 lb)   SpO2 97%   BMI 21.14 kg/m²     General: Awake, cooperative, no acute distress    HEENT: NC, Atraumatic. PERRLA, EOMI. Anicteric sclerae. Lungs:  CTA Bilaterally. No Wheezing/Rhonchi/Rales. Heart:  Regular  rhythm,  No murmur, No Rubs, No Gallops  Abdomen: Soft, Non distended, Non tender. +Bowel sounds,   Extremities: No c/c/e  Psych:   Not anxious or agitated.   Neurologic:  No acute neurological deficits. Admission HPI Shelia Young is a 75 y.o. female essentially healthy except for history of hyperlipidemia and overactive bladder, takes simvastatin, multivitamin, calcium supplement and a medication for her bladder. Told that she has a history of anemia but is currently not taking any medications. She has no idea what her baseline hemoglobin is. Presents to the emergency room with a history of 6-7 days of fever. Yesterday she saw her PCP, Dr. Vero Whitaker male who she stated thought that she probably had a viral infection; obtain a rapid COVID test which was negative. Her symptoms began with fever and cold symptoms including cough, headache, loss of appetite, runny nose and congestion. She came to the emergency room today because she had 2 syncopal episodes yesterday when she felt in her back and the second 1 she fell and hit her head. She states that she does not remember anything she was normal 1 minute and woke up on the floor. Additional history she states that she follows Dr. Gautam Anand for hematuria but the last time he checked her urine was clear. Additional history she denies any history of bleeding gums when she brushes her teeth, she denies vaginal bleeding or blood in her stools or dark stools or tarry stools.   SHe is vaccinated for COVID-19 and booster and her flu is up-to-date    Hospital Course : She was admitted and given IV fluids and watch closely she was seen in consultation by hematology oncology who felt that her pancytopenia was related to a virus of note they do want her to follow-up with hematologist as an outpatient and they did send some studies the patient also had some acute back pain after her fall this was followed up by an MRI which did show a compression fracture which may have been acute on chronic we will follow-up as an outpatient about this she did not need any pain medicine    Activity: Activity as tolerated    Diet: Regular Diet    Follow-up: With Dr. Rolanda Cardona on Monday the 25th    Disposition: Home    Minutes spent on discharge: 35 minutes      Labs: Results:       Chemistry Recent Labs     07/23/22 0351 07/22/22 0411 07/21/22 0309   * 130* 171*   * 134* 131*   K 4.0 4.4 4.1   CL 99* 102 99*   CO2 29 29 24   BUN 9 11 11   CREA 0.61 0.70 0.73   CA 9.0 9.0 8.6   AGAP 3 3 8   BUCR 15 16 15    81 93   TP 7.0 7.1 7.1   ALB 2.9* 3.1* 3.6   GLOB 4.1* 4.0 3.5   AGRAT 0.7* 0.8 1.0      CBC w/Diff Recent Labs     07/23/22 0351 07/22/22 0411 07/21/22 0309   WBC 1.4* 1.2* 1.1*   RBC 2.83* 2.84* 3.11*   HGB 8.7* 9.0* 9.7*   HCT 26.1* 26.3* 28.4*   PLT 57* 57* 63*   GRANS 36* 12* 51   LYMPH 44 84* 38   EOS 0 0 0      Cardiac Enzymes No results for input(s): CPK, CKND1, JOB in the last 72 hours. No lab exists for component: CKRMB, TROIP   Coagulation No results for input(s): PTP, INR, APTT, INREXT in the last 72 hours. Lipid Panel No results found for: CHOL, CHOLPOCT, CHOLX, CHLST, CHOLV, 005164, HDL, HDLP, LDL, LDLC, DLDLP, 103393, VLDLC, VLDL, TGLX, TRIGL, TRIGP, TGLPOCT, CHHD, CHHDX   BNP No results for input(s): BNPP in the last 72 hours. Liver Enzymes Recent Labs     07/23/22 0351   TP 7.0   ALB 2.9*         Thyroid Studies Lab Results   Component Value Date/Time    TSH 1.31 07/21/2022 03:09 AM            Significant Diagnostic Studies: XR SPINE LUMB MIN 4 V    Result Date: 7/21/2022  EXAM:  LUMBAR SPINE SERIES INDICATION:  Low back pain. TECHNIQUE: AP, lateral and oblique views. COMPARISON:  None. ______________________ FINDINGS:  There are five  non rib bearing lumbar vertebra. Superior endplate depression of the L3 vertebral body. No subluxation. No spondylolysis is present. The intervertebral disc spaces are well maintained. ______________________     Superior endplate depression of the L3 vertebral body of indeterminate age. Although appears chronic, correlate with level pain.     MRI LUMB SPINE W WO CONT    Result Date: 7/23/2022  EXAM: MRI lumbar spine without and with contrast 7/23/2022. CLINICAL INDICATION/HISTORY: Ground-level fall on 7/20/2022. Low back pain. TECHNIQUE: Multiplanar, multisequential images of the lumbar spine were obtained before and after the administration of 10cc of IV ProHance. COMPARISON: Lumbar radiographs from 7/21/2022. FINDINGS:  Moderate to severe superior endplate compression fracture is present at the L3 level. Minimal retropulsion of the posterior superior corner of L3 is noted. There is only minimal edema extending subjacent to the superior endplate with associated mild abnormal enhancement. There is also mild paraspinal edema. The imaging appearance suggests a subacute to chronic etiology but a mild, acute on chronic component could have a similar appearance. Multilevel degenerative disc and degenerative facet disease are present with areas of disc desiccation and disc space narrowing with mild marginal osteophytic spurring. No additional compression deformities are present. There is no evidence of spondylolisthesis. The spinal canal and foramina are well maintained from T11-T12 through L1-L2. At L2-3, there is mild, diffuse disc bulge with retropulsion of the posterior superior corner of L2. Hypertrophic facet changes and ligamentum flavum hypertrophy are present. Central canal stenosis is minimal.  The foramina are patent. Along the ventral aspect of the disc space, there is abnormal signal extending superiorly along the anterior aspect of the L2 vertebral body. This extends along the inferior half of the vertebral body and is associated with mild surrounding edema. The imaging appearance is most suggestive of anterior, subligamentous disc. Mild associated enhancement is present. At L3-4, the spinal canal and foramina are patent. At L4-5, there is mild disc bulge with hypertrophic facet changes but the spinal canal and foramina are widely patent.  At L5-S1, there are degenerative changes but the spinal canal and foramina are well maintained. The conus medullaris ends appropriately at the L1 level and is normal in contour without abnormal signal.  The visualized portions of the cauda equina nerve roots are unremarkable. No additional areas of abnormal enhancement are identified. 1.Moderate to severe L3 compression fracture, late subacute to old. However, given the mild superior endplate edema and enhancement with mild paraspinal edema, and acute on chronic component cannot be completely excluded. 2.  Probable subligamentous disc extrusion along the ventral aspect of the vertebral column, extending superiorly along the inferior half of L2 with associated edema and abnormal enhancement suggesting an acute or subacute etiology. 3.  Multilevel degenerative disc and degenerative facet disease. Mild retropulsion along the posterior superior corner of L3 contributes to minimal central canal stenosis at this level. No significant areas of degenerative canal or foraminal encroachment are identified. MRI PELV W WO CONT    Result Date: 7/23/2022  EXAM:  MRI of the pelvis without and with contrast 7/23/2022 INDICATION:   Ground-level fall on 7/20/2022. Pain. COMPARISON: None TECHNIQUE: Multiplanar, multisequential images of the pelvis were obtained before and after the administration of 10 cc of IV ProHance. FINDINGS: Bone alignment and articulation are within normal limits for age with mild degenerative changes. No displaced fractures are identified and there is no evidence of abnormal marrow signal to suggest bone contusion, microtrabecular fracture, or occult fracture. There is no evidence of joint effusion and no abnormal periarticular fluid collections are identified. Evaluation of the pelvic viscera demonstrates colonic diverticulosis without MRI findings to suggest diverticulitis. The urinary bladder is unremarkable.   No abnormal intrapelvic masses or fluid collections are identified. No significant areas of abnormal enhancement are identified. 1.  Mild degenerative changes in the hips. 2.  Colonic diverticulosis. 3.  Otherwise, unremarkable evaluation. Specifically, no displaced or occult fractures are identified. CT HEAD WO CONT    Result Date: 7/21/2022  EXAM: CT head INDICATION: Fall. Head injury. Pain. COMPARISON: None. TECHNIQUE: Axial CT imaging of the head was performed without intravenous contrast. Dose reduction techniques used: automated exposure control, adjustment of the mAs and/or kVp according to patient size, and iterative reconstruction techniques. Digital imaging and communications in Medicine (DICOM) format image data are available to nonaffiliated external healthcare facilities or entities on a secure, media free, reciprocally searchable basis with patient authorization for at least 12 months after this study. _______________ FINDINGS: BRAIN AND POSTERIOR FOSSA: The sulci, folia, ventricles and basal cisterns are within normal limits for the patient's age. There is no intracranial hemorrhage, mass effect, or midline shift. There are no areas of abnormal parenchymal attenuation. EXTRA-AXIAL SPACES AND MENINGES: There are no abnormal extra-axial fluid collections. CALVARIUM: Intact. SINUSES: There is a right sphenoid sinus opacity. There is ethmoid sinus mucosal thickening. OTHER: None. _______________     No acute intracranial abnormalities. Right sphenoid sinus opacity of uncertain acuity and current significance. XR CHEST PORT    Result Date: 7/21/2022  EXAM:  PORTABLE CHEST INDICATION:  Back pain. TECHNIQUE:  Portable, AP view. COMPARISON:  None. ____________________ FINDINGS:  SUPPORT DEVICES: None. HEART AND MEDIASTINUM: Cardiomediastinal silhouette appears within normal limits. Normal caliber thoracic aorta. LUNGS AND PLEURAL SPACES: Lungs are adequately expanded with no confluent airspace opacity or pulmonary edema.   No pleural effusion or pneumothorax. BONY THORAX AND SOFT TISSUES: No acute osseous abnormality. ____________________     No acute cardiopulmonary disease. ECHO ADULT COMPLETE    Result Date: 7/21/2022  Formatting of this result is different from the original.   Left Ventricle: Normal left ventricular systolic function with a visually estimated EF of 55 - 60%. Left ventricle size is normal. Normal wall thickness. Normal wall motion. Grade I diastolic dysfunction present with normal LV EF. Tricuspid Valve: Mild regurgitation. The estimated PASP is 30 mmHg.                  CC: Dunia Castro MD

## 2022-07-23 NOTE — PROGRESS NOTES
OCCUPATIONAL THERAPY EVALUATION/DISCHARGE    Patient: Tim West (72 y.o. female)  Date: 7/23/2022  Primary Diagnosis: Syncope [R55]  Leukopenia [D72.819]       Precautions:   Fall  PLOF: independent with ADLs and transfers     ASSESSMENT AND RECOMMENDATIONS:  Based on the objective data described below, the patient presents with requiring S for ADLs and transfers following above mentioned medical diagnosis. Pt performed bed mobility, supine<>sit, sit<>stand transfers, toilet transfers, toileting and grooming with S utilizing RW during this session. Pt reports no difficulty with transfers and ADLs and is looking forward to return home. Pt was left supine in bed at the end of session in NAD, pain 3/10 at lower back. Skilled occupational therapy is not indicated at this time. Discharge Recommendations: Home Health and None  Further Equipment Recommendations for Discharge: N/A      SUBJECTIVE:   Patient stated  I am doing alright.     OBJECTIVE DATA SUMMARY:   No past medical history on file. No past surgical history on file. Barriers to Learning/Limitations: None  Compensate with: visual, verbal, tactile, kinesthetic cues/model    Home Situation:   Home Situation  Home Environment: Private residence  # Steps to Enter: 3  Rails to Enter: Yes  One/Two Story Residence: One story  Living Alone: No  Support Systems: Spouse/Significant Other  Patient Expects to be Discharged to[de-identified] Home with family assistance  Current DME Used/Available at Home: Donnamae Tez, rolling  Tub or Shower Type: Tub/Shower combination  []     Right hand dominant   []     Left hand dominant    Cognitive/Behavioral Status:  Neurologic State: Alert  Orientation Level: Oriented X4  Cognition: Appropriate for age attention/concentration; Follows commands  Safety/Judgement: Fall prevention    Skin: intact  Edema: none    Vision/Perceptual:    Tracking: Able to track stimulus in all quadrants w/o difficulty    Coordination: BUE  Coordination: Within functional limits  Fine Motor Skills-Upper: Left Intact; Right Intact    Gross Motor Skills-Upper: Left Intact; Right Intact  Balance:  Sitting: Intact  Standing: Intact; With support  Strength: BUE  Strength: Generally decreased, functional  Tone & Sensation: BUE  Tone: Normal  Sensation: Intact  Range of Motion: BUE  AROM: Generally decreased, functional  Functional Mobility and Transfers for ADLs:  Bed Mobility:  Rolling: Supervision  Supine to Sit: Supervision  Sit to Supine: Supervision  Transfers:  Sit to Stand: Stand-by assistance  Stand to Sit: Stand-by assistance;Supervision   Toilet Transfer : Supervision  ADL Assessment:  Feeding: Independent    Oral Facial Hygiene/Grooming: Supervision    Upper Body Dressing: Supervision    Lower Body Dressing: Supervision    Toileting: Supervision  ADL Intervention:  Grooming  Grooming Assistance: Supervision  Position Performed: Standing  Washing Hands: Supervision    Toileting  Toileting Assistance: Supervision  Bladder Hygiene: Supervision  Clothing Management: Supervision    Cognitive Retraining  Safety/Judgement: Fall prevention  Pain:  Pain level pre-treatment: 0/10   Pain level post-treatment: 0/10   Pain Intervention(s): Medication (see MAR); Rest, Ice, Repositioning   Response to intervention: Nurse notified, See doc flow    Activity Tolerance:   Good     Please refer to the flowsheet for vital signs taken during this treatment. After treatment:   []  Patient left in no apparent distress sitting up in chair  [x]  Patient left in no apparent distress in bed  [x]  Call bell left within reach  [x]  Nursing notified  []  Caregiver present  []  Bed alarm activated    COMMUNICATION/EDUCATION:   [x]      Role of Occupational Therapy in the acute care setting  [x]      Home safety education was provided and the patient/caregiver indicated understanding. [x]      Patient/family have participated as able and agree with findings and recommendations.   []      Patient is unable to participate in plan of care at this time. Thank you for this referral.  Carmen Blunt OTR/L  Time Calculation: 13 mins      Eval Complexity: History: LOW Complexity : Brief history review ; Examination: LOW Complexity : 1-3 performance deficits relating to physical, cognitive , or psychosocial skils that result in activity limitations and / or participation restrictions ; Decision Making:MEDIUM Complexity : Patient may present with comorbidities that affect occupational performnce.  Miniml to moderate modification of tasks or assistance (eg, physical or verbal ) with assesment(s) is necessary to enable patient to complete evaluation

## 2022-07-23 NOTE — PROGRESS NOTES
Hospitalist Progress Note    Patient: Froy Mathews MRN: [id number]  CSN: [id number]    YOB: 1955  Age: 75 y.o. Sex: female    DOA: 7/21/2022 LOS:  LOS: 2 days                Assessment and Plan:    Principal Problem:    Pancytopenia ([address] (7/21/2022)    Active Problems:    Syncope (7/21/2022)      Fever (7/21/2022)      Low back pain (7/22/2022)    Pancytopenia: labs about the same. Hematology feels this may be viral suppression. Her neutrophils are better today    She has had a borderline low white count for 15 years as an outpatient but nothing else    Low back pain: MRI pending    Syncope:  telemetry and echo is ok        Chief complaint:  syncope, pancytopenia and fever. Subjective:  She feels much better       Review of systems:    General: No fevers or chills. Cardiovascular: No chest pain or pressure. No palpitations. Pulmonary: No shortness of breath. Gastrointestinal: No nausea, vomiting. Objective:    Vital signs/Intake and Output:  Visit Vitals  /71   Pulse 85   Temp 98.7 °F (37.1 °C)   Resp 17   Ht 5' 7\" (1.702 m)   Wt 61.2 kg (135 lb)   SpO2 97%   BMI 21.14 kg/m²     Current Shift:  No intake/output data recorded. Last three shifts:  07/21 1901 - 07/23 0700  In: 480 [P.O.:480]  Out: 1 [Urine:1]    Physical Exam:  General: NAD, AAOx3. Non-toxic. HEENT: NC/AT. PERRLA, EOMI.  MMM. Lungs: Nml inspection. CTA B/L. No wheezing, rales or rhonchi. Heart:  S1S2 RRR,  PMI mid 5th IC space. No M/RG. Abdomen: Soft, NT/ND.  BS+. No peritoneal signs. Extremities: No C/C/E. Psych:   Nml affect. Neurologic:  2-12 intact. Strength 5/5 throughout.   Sensation symmetrical.          Labs: Results:       Chemistry Recent Labs     07/23/22  0351 07/22/22  0411 07/21/22  0309   * 130* 171*   * 134* 131*   K 4.0 4.4 4.1   CL 99* 102 99*   CO2 29 29 24   BUN 9 11 11   CREA 0.61 0.70 0.73   CA 9.0 9.0 8.6   AGAP 3 3 8   BUCR 15 16 15    81 93   TP 7.0 7.1 7.1   ALB 2.9* 3.1* 3.6   GLOB 4.1* 4.0 3.5   AGRAT 0.7* 0.8 1.0      CBC w/Diff Recent Labs     07/23/22  0351 07/22/22  0411 07/21/22  0309   WBC 1.4* 1.2* 1.1*   RBC 2.83* 2.84* 3.11*   HGB 8.7* 9.0* 9.7*   HCT 26.1* 26.3* 28.4*   PLT 57* 57* 63*   GRANS 36* 12* 51   LYMPH 44 84* 38   EOS 0 0 0      Cardiac Enzymes No results for input(s): CPK, CKND1, JOB in the last 72 hours. No lab exists for component: CKRMB, TROIP   Coagulation No results for input(s): PTP, INR, APTT, INREXT in the last 72 hours. Lipid Panel No results found for: CHOL, CHOLPOCT, CHOLX, CHLST, CHOLV, 137515, HDL, HDLP, LDL, LDLC, DLDLP, 557817, VLDLC, VLDL, TGLX, TRIGL, TRIGP, TGLPOCT, CHHD, CHHDX   BNP No results for input(s): BNPP in the last 72 hours.    Liver Enzymes Recent Labs     07/23/22  0351   TP 7.0   ALB 2.9*         Thyroid Studies Lab Results   Component Value Date/Time    TSH 1.31 07/21/2022 03:09 AM        Procedures/imaging: see electronic medical records for all procedures/Xrays and details which were not copied into this note but were reviewed prior to creation of Plan

## 2022-07-23 NOTE — CONSULTS
Chart noted and reviewed. Counts stable. Do not delay discharge based upon stable counts. No role for inpatient bone marrow biopsy. Checking chart. Merline Brady M.D.   [facility]

## 2022-07-23 NOTE — PROGRESS NOTES
Problem: Mobility Impaired (Adult and Pediatric)  Goal: *Acute Goals and Plan of Care (Insert Text)  Description: Physical Therapy Goals  Initiated 7/23/2022 and to be accomplished within 7 day(s)  1. Patient will move from supine to sit and sit to supine  in bed with independence. 2.  Patient will transfer from bed to chair and chair to bed with modified independence using the least restrictive device. 3.  Patient will perform sit to stand with modified independence. 4.  Patient will ambulate with modified independence for 150 feet with the least restrictive device. 5.  Patient will ascend/descend 3 stairs with handrail(s) with supervision/set-up. Note:   physical Therapy EVALUATION    Patient: Simon Franks (21 y.o. female)  Date: 7/23/2022  Primary Diagnosis: Syncope [R55]  Leukopenia [D72.819]  Precautions:  Fall    ASSESSMENT :  Based on the objective data described below, the patient presents with lower extremity weakness, decreased gait quality and endurance, pain with mobility. Pt performed supine to sit with supervision, sit to stand with CGA. Patient ambulated 100 feet with RW, GB applied, CGA. Trial ambulation without AD and pt able  to ambulate with CGA however significantly improve gait quality, ronda and fluidity with use of RW. Will pain with mobility. Will continue to follow PT for PT for 1-2 session to progress mobility, review education and stair training. Patient will benefit from skilled intervention to address the above impairments.   Patients rehabilitation potential is considered to be Good  Factors which may influence rehabilitation potential include:   []         None noted  []         Mental ability/status  []         Medical condition  []         Home/family situation and support systems  []         Safety awareness  []         Pain tolerance/management  []         Other:      PLAN :  Recommendations and Planned Interventions:  [x]           Bed Mobility Training [x]    Neuromuscular Re-Education  [x]           Transfer Training                   []    Orthotic/Prosthetic Training  [x]           Gait Training                          [x]    Modalities  [x]           Therapeutic Exercises          []    Edema Management/Control  [x]           Therapeutic Activities            [x]    Patient and Family Training/Education  []           Other (comment):    Frequency/Duration: Patient will be followed by physical therapy 1-2 times per day for 3-7 days per week to address goals. Discharge Recommendations: Outpatient  Further Equipment Recommendations for Discharge: rolling walker     SUBJECTIVE:   Patient stated I am doing ok, my back hurts.     OBJECTIVE DATA SUMMARY:   No past medical history on file. No past surgical history on file. Barriers to Learning/Limitations: None  Compensate with: Visual Cues and Verbal Cues  Prior Level of Function/Home Situation: Independent ambulation without AD  Home Situation  Home Environment: Private residence  # Steps to Enter: 3  Rails to Enter: Yes  One/Two Story Residence: One story  Living Alone: No  Support Systems: Spouse/Significant Other  Patient Expects to be Discharged to[de-identified] Home with family assistance  Current DME Used/Available at Home: Walker, rolling  Strength:    Strength: Generally decreased, functional  Tone & Sensation:   Tone: Normal  Sensation: Intact  Range Of Motion:  AROM: Generally decreased, functional  Functional Mobility:  Bed Mobility:  Supine to Sit: Supervision  Sit to Supine: Supervision  Transfers:  Sit to Stand: Contact guard assistance  Stand to Sit: Stand-by assistance  Balance:   Sitting: Intact  Standing: Intact; With support  Ambulation/Gait Training:  Distance (ft): 100 Feet (ft)  Assistive Device: Gait belt;Walker, rolling  Ambulation - Level of Assistance: Contact guard assistance;Stand-by assistance   Gait Description (WDL): Exceptions to WDL  Gait Abnormalities: Decreased step clearance  Base of Support: Narrowed  Stance: Weight shift  Speed/Nitza: Slow  Step Length: Right shortened;Left shortened  Activity Tolerance:   Good  Please refer to the flowsheet for vital signs taken during this treatment. After treatment:   []         Patient left in no apparent distress sitting up in chair  [x]         Patient left in no apparent distress in bed  [x]         Call bell left within reach  [x]         Nursing notified  []         Caregiver present  []         Bed alarm activated    COMMUNICATION/EDUCATION:   [x]         Fall prevention education was provided and the patient/caregiver indicated understanding. [x]         Patient/family have participated as able in goal setting and plan of care. [x]         Patient/family agree to work toward stated goals and plan of care. []         Patient understands intent and goals of therapy, but is neutral about his/her participation. []         Patient is unable to participate in goal setting and plan of care.     Thank you for this referral.  Marquita Chatterjee   Time Calculation: 23 mins    Eval Complexity: History: MEDIUM  Complexity : 1-2 comorbidities / personal factors will impact the outcome/ POC Exam:LOW Complexity : 1-2 Standardized tests and measures addressing body structure, function, activity limitation and / or participation in recreation  Presentation: LOW Complexity : Stable, uncomplicated  Clinical Decision Making:Low Complexity    Overall Complexity:LOW

## 2022-08-05 NOTE — PROGRESS NOTES
Physician Progress Note      Gloria Ruth  CSN #:                  [id number]  :                       1955  ADMIT DATE:       2022 2:32 AM  Jeremy Stoner DATE:        2022 5:55 PM  RESPONDING  PROVIDER #:        Sean Garcia MD          QUERY TEXT:    Patient admitted with Syncope . If possible, please document in progress notes and discharge summary if you are evaluating and/or treating any of the following: The medical record reflects the following:  Risk Factors: fever, severe low back pain  Clinical Indicators: syncopal fall, back pain , /47, 123/56, 90/35  Treatment: NS 1000 ml IVF bolus, Morphine for pain    Thank You  Lainey Blake RN, CDI, CRCR  Options provided:  -- Syncope due to , please specify, please specify  -- Syncope due to an arrhythmia, please specify, please specify . -- Syncope due to other hypotension  -- Syncope due to Orthostatic hypotension  -- Other - I will add my own diagnosis  -- Disagree - Not applicable / Not valid  -- Disagree - Clinically unable to determine / Unknown  -- Refer to Clinical Documentation Reviewer    PROVIDER RESPONSE TEXT:    Provider is clinically unable to determine a response to this query.     Query created by: Krystal Gunderson on 2022 2:05 PM      Electronically signed by:  Sean Garcia MD 2022 8:23 PM